# Patient Record
Sex: FEMALE | Race: WHITE | URBAN - NONMETROPOLITAN AREA
[De-identification: names, ages, dates, MRNs, and addresses within clinical notes are randomized per-mention and may not be internally consistent; named-entity substitution may affect disease eponyms.]

---

## 2020-09-30 ENCOUNTER — TELEPHONE (OUTPATIENT)
Dept: OBGYN CLINIC | Age: 20
End: 2020-09-30

## 2020-09-30 NOTE — TELEPHONE ENCOUNTER
Patient calling states that she hasnt started menses yet and is late but negative SPT, wanted to discuss. LMP was 8/15, states she had some brown spotting 9/22-9/24. States sometimes she is sick to her stomach but no vomiting, states negative HPT a few times. Discussed irregular menses, and if she was concerned we could have her come in and discuss if she misses multiple cycles. Advised her for now to monitor, with the spotting if she has a true menses 4 weeks after that time maybe that spotting was just a very light cycle. Offered her a SPT for peace of mind but she wants to wait a little longer and see if cycle starts, may call back if she wants to do an SPT.

## 2021-01-28 ENCOUNTER — HOSPITAL ENCOUNTER (OUTPATIENT)
Age: 21
Setting detail: SPECIMEN
Discharge: HOME | End: 2021-01-28
Payer: COMMERCIAL

## 2021-01-28 ENCOUNTER — OFFICE VISIT (OUTPATIENT)
Dept: OBGYN CLINIC | Age: 21
End: 2021-01-28
Payer: COMMERCIAL

## 2021-01-28 VITALS
HEIGHT: 68 IN | BODY MASS INDEX: 36.83 KG/M2 | WEIGHT: 243 LBS | SYSTOLIC BLOOD PRESSURE: 116 MMHG | DIASTOLIC BLOOD PRESSURE: 78 MMHG

## 2021-01-28 DIAGNOSIS — Z01.419 WOMEN'S ANNUAL ROUTINE GYNECOLOGICAL EXAMINATION: Primary | ICD-10-CM

## 2021-01-28 PROCEDURE — G8484 FLU IMMUNIZE NO ADMIN: HCPCS | Performed by: NURSE PRACTITIONER

## 2021-01-28 PROCEDURE — 99395 PREV VISIT EST AGE 18-39: CPT | Performed by: NURSE PRACTITIONER

## 2021-01-28 NOTE — PROGRESS NOTES
Blood pressure 116/78, height 5' 8\" (1.727 m), weight 243 lb (110.2 kg), last menstrual period 01/08/2021. Estimated body mass index is 36.95 kg/m² as calculated from the following:    Height as of this encounter: 5' 8\" (1.727 m). Weight as of this encounter: 243 lb (110.2 kg). HPI: Patient presents today for annual exam. Denies breast/pelvic pain. Due for first pap. Reports monthly menses aside from last cycle- went 70 days. Admits to significant stress with getting , buying/selling a home in December. TTC x 1 month    Review of Systems   All other systems reviewed and are negative. Objective  Heent:   negative   Cor: regular rate and rhythm, no murmurs              Pul:clear to auscultation bilaterally- no wheezes, rales or rhonchi, normal air movement, no respiratory distress      GI: Abdomen soft, non-tender. BS normal. No masses,  No organomegaly           Extremities: normal strength, tone, and muscle mass, ROM of all joints is normal   Breasts: Breast:normal appearance, no masses or tenderness, No nipple retraction or dimpling, No nipple discharge or bleeding   Pelvic Exam: GENITAL/URINARY:  External Genitalia:  General appearance; normal, Hair distribution; normal, Lesions absent  Urethral Meatus:  Size normal, Location normal, Lesions absent, Prolapse absent  Urethra: Fullness absent, Masses absent  Bladder:  Fullness absent, Masses absent, Tenderness absent, Cystocele absent  Vagina:  General appearance normal, Estrogen effect normal, Discharge absent, Lesions absent, Pelvic support normal  Cervix:  General appearance normal, Lesions absent, Discharge absent, Tenderness absent, Enlargement absent, Nodularity absent  Uterus:  Size normal, Tenderness absent  Adenexa:   Masses absent, Tenderness absent  Anus/Perineum:  Lesions absent and Masses absent                                    Vaginal discharge: no vaginal discharge                             Assessment and Plan Diagnosis Orders   1. Women's annual routine gynecological examination  PAP SMEAR       continue to track menses at this time      I am having Evaristo Schaefer maintain her Prenatal Vit-Fe Fumarate-FA (PRENATAL VITAMIN PO). Return in about 1 year (around 1/28/2022) for yearly. She was also counseled on her preventative health maintenance recommendations and follow-up. There are no Patient Instructions on file for this visit.     [facility] [place],1/28/2021 1:36 PM

## 2021-02-08 LAB — CYTOLOGY REPORT: NORMAL

## 2021-03-17 ENCOUNTER — TELEPHONE (OUTPATIENT)
Dept: OBGYN CLINIC | Age: 21
End: 2021-03-17

## 2021-03-17 NOTE — TELEPHONE ENCOUNTER
Patient left a message on the voicemail that she had some questions about infertility. They have been attempting for about 6 months without success and patient's cycles are very irregular. Her mother told her that she used Clomid and patient is interested in discussing this. I can schedule her a visit, but is there anything that you would like done before this visit? Labs? Ultrasound?

## 2021-03-18 NOTE — TELEPHONE ENCOUNTER
Nope - bring in for an infertility consult with me and we will discuss things further.   W/U will be based on her healthy hx, menses pattern etc.  Needs a 30 minute visit

## 2021-03-18 NOTE — TELEPHONE ENCOUNTER
Spoke to patient and reviewed Giselle's response. She is scheduled for an infertility consult on 4/7. Patient verbalized understanding, no further questions/concerns voiced.

## 2021-04-07 ENCOUNTER — OFFICE VISIT (OUTPATIENT)
Dept: OBGYN CLINIC | Age: 21
End: 2021-04-07
Payer: COMMERCIAL

## 2021-04-07 VITALS
WEIGHT: 250.6 LBS | HEIGHT: 68 IN | DIASTOLIC BLOOD PRESSURE: 80 MMHG | SYSTOLIC BLOOD PRESSURE: 120 MMHG | BODY MASS INDEX: 37.98 KG/M2

## 2021-04-07 DIAGNOSIS — Z13.29 SCREENING FOR ENDOCRINE DISORDER: ICD-10-CM

## 2021-04-07 DIAGNOSIS — Z31.49 PROCREATION MANAGEMENT INVESTIGATION AND TESTING: Primary | ICD-10-CM

## 2021-04-07 DIAGNOSIS — N92.6 IRREGULAR MENSES: ICD-10-CM

## 2021-04-07 PROCEDURE — 99214 OFFICE O/P EST MOD 30 MIN: CPT | Performed by: ADVANCED PRACTICE MIDWIFE

## 2021-04-07 PROCEDURE — 1036F TOBACCO NON-USER: CPT | Performed by: ADVANCED PRACTICE MIDWIFE

## 2021-04-07 PROCEDURE — G8417 CALC BMI ABV UP PARAM F/U: HCPCS | Performed by: ADVANCED PRACTICE MIDWIFE

## 2021-04-07 PROCEDURE — G8427 DOCREV CUR MEDS BY ELIG CLIN: HCPCS | Performed by: ADVANCED PRACTICE MIDWIFE

## 2021-04-07 ASSESSMENT — ENCOUNTER SYMPTOMS
RESPIRATORY NEGATIVE: 1
GASTROINTESTINAL NEGATIVE: 1

## 2021-04-07 NOTE — PROGRESS NOTES
normal.         Behavior: Behavior normal.         Thought Content: Thought content normal.         Judgment: Judgment normal.   Vitals signs and nursing note reviewed. Vital Signs  Blood pressure 120/80, height 5' 8\" (1.727 m), weight 250 lb 9.6 oz (113.7 kg), last menstrual period 2021. HPI Desires to conceive. Reports that stopped preventing pregnancy 2020 and were \"just going with the flow\" but end of Dec 2020 into 2021 started tracking cycles and using OPT at home. Has had some irreg cycles for the last year and has had periods of no ovulation with some cycles. Patient Information  23year old,   BMI 38  LMP 3/21 - currently cycle day 18  2020 to 2021 had a 70 day cycle   to Feb 31 day cycle and did have +OPT at home  Feb to March was 42 day cycle and -OPT at home  Menarch age 13 and cycles were regular \"for years\" every 32 days until approx 1 year ago when she and partner moved in together then they became longer and more irregular  Regarding diet - patient laughed when I inquired - does not eat breakfast \"hardly ever\", lunch often has sandwich or left overs, cooks at home for dinner. Admits to high CHO intake, some vegetables. Soda 1-2/day, 2-3 cisneros/day  Social ETOH, denies drug/tobacco use  Exercise - \"some volleyball\"  Has not monitored BBT or CM  Sister has PCOS  Mother had infertility \"until she started Clomid\"  Has had significant weight gain over the last 7 years    Spouse Information  24year old, 7'9'', 240#  Diet - same as with patient  Exercise - some volleyball  Denies any hx of genital trauma that patient is aware of  No other children  Does not take any medications/vitamins  Social ETOH, denies drug/tobacco use                            Assessment and Plan          Diagnosis Orders   1. Procreation management investigation and testing     2. Irregular menses     3. Body mass index (BMI) 38.0-38.9, adult     4.  Screening for endocrine disorder Leaving for [place] tomorrow and will return next week therefore no day 21 labs this cycle. Discussed current recommendations for treatment and w/u for infertility. Discussed age <30 therefore recommend tracking and actively TTC x12 months prior to considering use of medications such as Clomid, Femara, or RE involvement. She verbalized understanding of this. Discussed that suspect underlying pathology such as PCOS or IR could be cause of her irreg cycles. Discussed w/u for this. Patient will return cycle day 3 for FSH/LH, cycle day 21 for progesterone level. Discussed will also add IR panel, TSH, A1C for her to complete one of the days doing other labs. Patient given notes of time table for labs to be done and will return. Discussed if IR present - primary treatment is diet/exercise and can consider use of metformin to try to regulate levels and subsequently cycles due to hormonal involvement. If anovulatory can consider use of prog supp. If abn thyroid then will need to see PCP. No follicle scan at this time - will await initial labs     Spent >50% of time with patient but >30 minutes regarding evaluation, discussing possible treatment options, and plan of care    I am having Zac Farr maintain her Prenatal Vit-Fe Fumarate-FA (PRENATAL VITAMIN PO). Return if symptoms worsen or fail to improve. She was also counseled on her preventative health maintenance recommendations and follow-up. There are no Patient Instructions on file for this visit.     Nannette MILLER,4/7/2021 8:33 AM                   Electronically signed by DONATO Chaudhry CNM

## 2021-10-12 ENCOUNTER — HOSPITAL ENCOUNTER (OUTPATIENT)
Age: 21
Setting detail: SPECIMEN
Discharge: HOME | End: 2021-10-12
Payer: COMMERCIAL

## 2021-10-12 DIAGNOSIS — Z31.49 PROCREATION MANAGEMENT INVESTIGATION AND TESTING: ICD-10-CM

## 2021-10-12 DIAGNOSIS — Z13.29 SCREENING FOR THYROID DISORDER: ICD-10-CM

## 2021-10-12 DIAGNOSIS — Z31.49 PROCREATION MANAGEMENT INVESTIGATION AND TESTING: Primary | ICD-10-CM

## 2021-10-12 DIAGNOSIS — Z13.29 SCREENING FOR ENDOCRINE DISORDER: ICD-10-CM

## 2021-10-12 LAB
FOLLICLE STIMULATING HORMONE: 6.4 U/L (ref 1.7–21.5)
LH: 6.2 U/L (ref 1–95.6)
TSH SERPL DL<=0.05 MIU/L-ACNC: 2.53 MIU/L (ref 0.3–5)

## 2021-10-12 PROCEDURE — 36415 COLL VENOUS BLD VENIPUNCTURE: CPT | Performed by: ADVANCED PRACTICE MIDWIFE

## 2021-10-13 LAB
ESTIMATED AVERAGE GLUCOSE: 91 MG/DL
HBA1C MFR BLD: 4.8 % (ref 4–6)

## 2021-10-28 ENCOUNTER — HOSPITAL ENCOUNTER (OUTPATIENT)
Age: 21
Setting detail: SPECIMEN
Discharge: HOME | End: 2021-10-28
Payer: COMMERCIAL

## 2021-10-28 DIAGNOSIS — Z13.29 SCREENING FOR THYROID DISORDER: ICD-10-CM

## 2021-10-28 DIAGNOSIS — Z13.29 SCREENING FOR ENDOCRINE DISORDER: ICD-10-CM

## 2021-10-28 DIAGNOSIS — Z31.49 PROCREATION MANAGEMENT INVESTIGATION AND TESTING: ICD-10-CM

## 2021-10-28 LAB
AMOUNT GLUCOSE GIVEN: 75 G
GLUCOSE FASTING: 82 MG/DL (ref 65–99)
GLUCOSE TOLERANCE TEST 1 HOUR: 156 MG/DL (ref 65–184)
GLUCOSE TOLERANCE TEST 2 HOUR: 125 MG/DL (ref 65–139)
INSULIN COMMENT: NORMAL
INSULIN COMMENT: NORMAL
INSULIN REFERENCE RANGE:: NORMAL
INSULIN REFERENCE RANGE:: NORMAL
INSULIN: 188.9 MU/L
INSULIN: 31.1 MU/L
PROGESTERONE LEVEL: 0.22 NG/ML

## 2021-11-04 ENCOUNTER — OFFICE VISIT (OUTPATIENT)
Dept: OBGYN CLINIC | Age: 21
End: 2021-11-04
Payer: COMMERCIAL

## 2021-11-04 VITALS
HEIGHT: 68 IN | WEIGHT: 250 LBS | DIASTOLIC BLOOD PRESSURE: 64 MMHG | BODY MASS INDEX: 37.89 KG/M2 | SYSTOLIC BLOOD PRESSURE: 104 MMHG

## 2021-11-04 DIAGNOSIS — E88.81 INSULIN RESISTANCE: ICD-10-CM

## 2021-11-04 DIAGNOSIS — Z31.49 PROCREATION MANAGEMENT INVESTIGATION AND TESTING: Primary | ICD-10-CM

## 2021-11-04 PROCEDURE — G8427 DOCREV CUR MEDS BY ELIG CLIN: HCPCS | Performed by: ADVANCED PRACTICE MIDWIFE

## 2021-11-04 PROCEDURE — G8417 CALC BMI ABV UP PARAM F/U: HCPCS | Performed by: ADVANCED PRACTICE MIDWIFE

## 2021-11-04 PROCEDURE — 1036F TOBACCO NON-USER: CPT | Performed by: ADVANCED PRACTICE MIDWIFE

## 2021-11-04 PROCEDURE — 99214 OFFICE O/P EST MOD 30 MIN: CPT | Performed by: ADVANCED PRACTICE MIDWIFE

## 2021-11-04 PROCEDURE — G8484 FLU IMMUNIZE NO ADMIN: HCPCS | Performed by: ADVANCED PRACTICE MIDWIFE

## 2021-11-04 ASSESSMENT — ENCOUNTER SYMPTOMS
RESPIRATORY NEGATIVE: 1
GASTROINTESTINAL NEGATIVE: 1

## 2021-11-04 NOTE — PATIENT INSTRUCTIONS
Patient Education        Insulin Resistance: Care Instructions  Your Care Instructions     Insulin resistance means that the body can't use insulin as it should. Insulin lets sugar (glucose) enter the body's cells, where it is used for energy. It also helps muscles, fat, and liver cells store sugar to be released when needed. If the body tissues don't respond to insulin right, the blood sugar level rises. Insulin resistance mainly is caused by obesity. But other medical conditions, such as acromegaly and Cushing's syndrome, also can cause it. It can run in families too. Follow-up care is a key part of your treatment and safety. Be sure to make and go to all appointments, and call your doctor if you are having problems. It's also a good idea to know your test results and keep a list of the medicines you take. How can you care for yourself at home? · Take your medicines exactly as prescribed. Call your doctor if you think you are having a problem with your medicine. You will get more details on the specific medicines your doctor prescribes. · Make healthy food choices. · Get at least 30 minutes of exercise on most days of the week. Exercise helps control your blood sugar. It also helps you stay at a healthy weight. Walking is a good choice. You also may want to do other activities, such as running, swimming, cycling, or playing tennis or team sports. · Try to lose weight. Losing even a small amount of weight can help. · Do not smoke. If you need help quitting, talk to your doctor about stop-smoking programs and medicines. These can increase your chances of quitting for good. When should you call for help? Watch closely for changes in your health, and be sure to contact your doctor if:    · Your blood sugar stays outside the level your doctor set for you.     · You have any problems. Where can you learn more? Go to [email or web address] org and sign in to your [email or web address] account.  Enter [phone] in the Search Health Information box to learn more about \"Insulin Resistance: Care Instructions. \"     If you do not have an account, please click on the \"Sign Up Now\" link. Current as of: August 31, 2020               Content Version: 13.0  © 7607-1004 Healthwise, Incorporated. Care instructions adapted under license by [employer]. If you have questions about a medical condition or this instruction, always ask your healthcare professional. [address] any warranty or liability for your use of this information.

## 2021-11-04 NOTE — PROGRESS NOTES
PROBLEM VISIT     Date of service: 2021    Mary Rasheed  Is a 19 y.o.  female    PT's PCP is: Corrine Maloney MD     : 2000                                          Review of Systems   Constitutional: Negative. HENT: Negative. Respiratory: Negative. Gastrointestinal: Negative. Genitourinary: Negative. Musculoskeletal: Negative. Neurological: Negative. Psychiatric/Behavioral: Negative. Patient's last menstrual period was 10/10/2021 (exact date). OB History    Para Term  AB Living   0 0 0 0 0 0   SAB TAB Ectopic Molar Multiple Live Births   0 0 0 0 0 0        Social History     Tobacco Use   Smoking Status Never Smoker   Smokeless Tobacco Never Used        Social History     Substance and Sexual Activity   Alcohol Use Not Currently       Allergies: Pcn [penicillins]      Current Outpatient Medications:     progesterone (ENDOMETRIN) 100 MG suppository, Place 100 mg vaginally daily, Disp: , Rfl:     metFORMIN (GLUCOPHAGE) 500 MG tablet, Take 1 tablet by mouth daily (with breakfast) 500mg PO x1 week, increase to 1000mg PO x 1 week, then 1500mg PO daily for duration of treatment, Disp: 90 tablet, Rfl: 3    Prenatal Vit-Fe Fumarate-FA (PRENATAL VITAMIN PO), Take by mouth, Disp: , Rfl:     Social History     Substance and Sexual Activity   Sexual Activity Yes    Partners: Male       Chief Complaint   Patient presents with    Follow-up     Pt here to discuss lab results. Physical Exam  Constitutional:       Appearance: Normal appearance. She is obese. HENT:      Head: Normocephalic. Eyes:      Pupils: Pupils are equal, round, and reactive to light. Pulmonary:      Effort: Pulmonary effort is normal.   Musculoskeletal:         General: Normal range of motion. Cervical back: Normal range of motion. Neurological:      Mental Status: She is alert and oriented to person, place, and time. Skin:     General: Skin is warm and dry. Psychiatric:         Behavior: Behavior normal.   Vitals and nursing note reviewed. Vital Signs  Blood pressure 104/64, height 5' 8\" (1.727 m), weight 250 lb (113.4 kg), last menstrual period 10/10/2021. HPI  Here to discuss labs and management of plans for TTC. Recently started metformin - just this week, having diarrhea. Has plans for use of cyclical progestin. Reports has cycles that are 40+ days long. Using home OPT but \"not really sure where I am at\". Has decreased CHO (in the last 2 weeks cut it out), increased greens, cut out soda. Does not eat breakfast regularly, \"little\" lunch, and \"splurge\" for dinner. Exercises - some activity most days, volleyball, soccer. Has gym membership but does not use it. Diarrhea currently with metformin. Results reviewed today:    Reviewed labs again that were done 10/2021  Discussed idea thresholds of insulin for fasting and 2 hour in addition to progesterone levels. Assessment and Plan          Diagnosis Orders   1. Procreation management investigation and testing     2. Insulin resistance         Spent great deal of time discussing insulin resistance and its effects on the body in multiple areas. We also discussed importance of nutrition and more frequent meals to help with metabolism. We also discussed importance of regular exercise - 30 minutes daily of cardio activity. Continue to make nutrition changes. Spent 30 minutes with patient. I am having Cheryl Rivero maintain her Prenatal Vit-Fe Fumarate-FA (PRENATAL VITAMIN PO), metFORMIN, and progesterone. Return in about 3 months (around 2/4/2022) for Yearly. She was also counseled on her preventative health maintenance recommendations and follow-up.      Patient Instructions     Patient Education        Insulin Resistance: Care Instructions  Your Care Instructions     Insulin resistance means that the body can't use insulin as it should. Insulin lets sugar (glucose) enter the body's cells, where it is used for energy. It also helps muscles, fat, and liver cells store sugar to be released when needed. If the body tissues don't respond to insulin right, the blood sugar level rises. Insulin resistance mainly is caused by obesity. But other medical conditions, such as acromegaly and Cushing's syndrome, also can cause it. It can run in families too. Follow-up care is a key part of your treatment and safety. Be sure to make and go to all appointments, and call your doctor if you are having problems. It's also a good idea to know your test results and keep a list of the medicines you take. How can you care for yourself at home? · Take your medicines exactly as prescribed. Call your doctor if you think you are having a problem with your medicine. You will get more details on the specific medicines your doctor prescribes. · Make healthy food choices. · Get at least 30 minutes of exercise on most days of the week. Exercise helps control your blood sugar. It also helps you stay at a healthy weight. Walking is a good choice. You also may want to do other activities, such as running, swimming, cycling, or playing tennis or team sports. · Try to lose weight. Losing even a small amount of weight can help. · Do not smoke. If you need help quitting, talk to your doctor about stop-smoking programs and medicines. These can increase your chances of quitting for good. When should you call for help? Watch closely for changes in your health, and be sure to contact your doctor if:    · Your blood sugar stays outside the level your doctor set for you.     · You have any problems. Where can you learn more? Go to [email or web address] org and sign in to your [employer] account. Enter [phone] in the [employer] box to learn more about \"Insulin Resistance: Care Instructions. \"     If you do not have an account, please click on the \"Sign Up Now\" link. Current as of: August 31, 2020               Content Version: 13.0  © 7252-2204 Healthwise, Incorporated. Care instructions adapted under license by [facility] ([facility]). If you have questions about a medical condition or this instruction, always ask your healthcare professional. [address] any warranty or liability for your use of this information.              DONATO Arias CNM,11/4/2021 12:32 PM                 Electronically signed by DONATO Arias CNM

## 2021-12-01 ENCOUNTER — TELEPHONE (OUTPATIENT)
Dept: OBGYN CLINIC | Age: 21
End: 2021-12-01

## 2021-12-01 NOTE — TELEPHONE ENCOUNTER
Patient called stating that she has been on the metformin for several months and cannot tolerate the side effects. She is running to the bathroom after she eats and noticing severe mood changes. She starting taking one metformin daily for a week, then increased to 2 pills daily and the side effects were intolerable, so she went back down to 1 pill daily. She continued on the 1 pill daily for another 2 weeks and attempted to get back up to 2 pills and was not able to function. She is questioning if there is anything else she can try? Can you please review and give recommendations?

## 2021-12-02 RX ORDER — METFORMIN HYDROCHLORIDE 500 MG/1
500 TABLET, EXTENDED RELEASE ORAL DAILY
Qty: 90 TABLET | Refills: 3 | Status: SHIPPED | OUTPATIENT
Start: 2021-12-02 | End: 2022-08-08

## 2022-01-05 ENCOUNTER — TELEPHONE (OUTPATIENT)
Dept: OBGYN CLINIC | Age: 22
End: 2022-01-05

## 2022-02-07 ENCOUNTER — OFFICE VISIT (OUTPATIENT)
Dept: OBGYN CLINIC | Age: 22
End: 2022-02-07
Payer: COMMERCIAL

## 2022-02-07 VITALS
WEIGHT: 247.6 LBS | HEIGHT: 68 IN | SYSTOLIC BLOOD PRESSURE: 104 MMHG | DIASTOLIC BLOOD PRESSURE: 74 MMHG | BODY MASS INDEX: 37.53 KG/M2

## 2022-02-07 DIAGNOSIS — E88.81 INSULIN RESISTANCE: ICD-10-CM

## 2022-02-07 DIAGNOSIS — Z12.72 SMEAR, VAGINAL, AS PART OF ROUTINE GYNECOLOGICAL EXAMINATION: Primary | ICD-10-CM

## 2022-02-07 DIAGNOSIS — Z01.419 SMEAR, VAGINAL, AS PART OF ROUTINE GYNECOLOGICAL EXAMINATION: Primary | ICD-10-CM

## 2022-02-07 DIAGNOSIS — Z31.49 ENCOUNTER FOR INVESTIGATION AND TESTING FOR PROCREATIVE MANAGEMENT: ICD-10-CM

## 2022-02-07 PROCEDURE — G8484 FLU IMMUNIZE NO ADMIN: HCPCS | Performed by: ADVANCED PRACTICE MIDWIFE

## 2022-02-07 PROCEDURE — G8417 CALC BMI ABV UP PARAM F/U: HCPCS | Performed by: ADVANCED PRACTICE MIDWIFE

## 2022-02-07 PROCEDURE — G8427 DOCREV CUR MEDS BY ELIG CLIN: HCPCS | Performed by: ADVANCED PRACTICE MIDWIFE

## 2022-02-07 PROCEDURE — 1036F TOBACCO NON-USER: CPT | Performed by: ADVANCED PRACTICE MIDWIFE

## 2022-02-07 PROCEDURE — 99213 OFFICE O/P EST LOW 20 MIN: CPT | Performed by: ADVANCED PRACTICE MIDWIFE

## 2022-02-07 PROCEDURE — 99395 PREV VISIT EST AGE 18-39: CPT | Performed by: ADVANCED PRACTICE MIDWIFE

## 2022-02-07 ASSESSMENT — ENCOUNTER SYMPTOMS
SHORTNESS OF BREATH: 0
ABDOMINAL PAIN: 0
DIARRHEA: 0
GASTROINTESTINAL NEGATIVE: 1
CONSTIPATION: 0
RESPIRATORY NEGATIVE: 1

## 2022-02-07 NOTE — PROGRESS NOTES
Lung Cancer Paternal Grandfather     Heart Failure Maternal Grandfather     Polycystic Ovary Syndrome Sister        Chief Complaint   Patient presents with    Annual Exam     Annual exam. Pap NL 1/28/21.  Menstrual Problem     About 2 weeks ago had 2-3 days spotting then nothing. Pt did not count this as cycle and is now on day 38. Labs:    No results found for this visit on 02/07/22. HPI: Annual.  Denies breast/pelvic concerns. Menses have been every 37-39 days. However most recent cycle - had positive home OPT on cycle day 19, spotted cycle day 23-25, and today cycle day 37. Has been having timed intercourse. Has been using progesterone 100mg qhs cycle day 16 through menses. She is aware if positive pregnancy test we will continue through first trimester. Continues metformin xr- loose stools improved some but still present - 2/day \"before it was all day every day so better\". Is continuing diet changes - more vegetables, fruit. Is now eating breakfast.  Struggling to eat something every few hours. Has increased water, decreased soda and ETOh use only 2/month. Going to the gym 2/wk. Has been TTC since 9/2020    Review of Systems   Constitutional: Negative. Negative for chills, fatigue and fever. HENT: Negative. Respiratory: Negative. Negative for shortness of breath. Cardiovascular: Negative. Negative for chest pain. Gastrointestinal: Negative. Negative for abdominal pain, constipation and diarrhea. Genitourinary: Negative for dysuria, enuresis, frequency, menstrual problem (continues TTC), pelvic pain, urgency and vaginal bleeding. Musculoskeletal: Negative. Neurological: Negative. Negative for dizziness, light-headedness and headaches. Psychiatric/Behavioral: Negative. Objective  Blood pressure 104/74, height 5' 8\" (1.727 m), weight 247 lb 9.6 oz (112.3 kg), last menstrual period 01/02/2022.   Physical Exam  Constitutional:       Appearance: Normal reviewed. Colonoscopy screening reviewed as well as onset for bone density testing. Birth control and barrier recommendationsdiscussed. STD counseling and prevention reviewed. Routine healthmaintenance per patients PCP. In addition to routine annual exam spent 20 minutes discussing procreation and plans to conceive. Encouraged to continue metformin, progesterone, diet/exercise. Encouraged to increase frequency that goes to the gym for more activity. We also discussed timed intercourse and follicle scan. Patient did inquire about clomid - discussed will not rx unless we have an initial follicle scan to determine candidacy and appropriate further treatment. Discussed call cycle day 1 for a day 12 scan with f/u. Also discussed semen analysis possibly - she would like to proceed with this. Script and cup with directions given to patient and she will have spouse complete. Encouraged her to call our office once her completes to make sure we can watch for and get results. I am having Anaid Hilario maintain her Prenatal Vit-Fe Fumarate-FA (PRENATAL VITAMIN PO), progesterone, and metFORMIN. Return in about 1 year (around 2/7/2023), or Call cycle day 1 of normal bleeding for a day 12 follicle scan and f/u, for Yearly. She was also counseled on her preventative health maintenance recommendations and follow-up. There are no Patient Instructions on file for this visit.     DONATO Alvares CNM,2/7/2022 9:20 AM

## 2022-02-14 ENCOUNTER — TELEPHONE (OUTPATIENT)
Dept: OBGYN CLINIC | Age: 22
End: 2022-02-14

## 2022-02-14 NOTE — TELEPHONE ENCOUNTER
Please call patient with semen analysis - received results - they are in scan pile    Overall normal but motility slightly low - not necessarily cause of issues conceiving at this time. I would suggest they continue with plan - call cycle day 1 for follicle scan day 12.   If they would like - he could see urology or both go to RE together but likely not going to treat him currently

## 2022-03-09 ENCOUNTER — TELEPHONE (OUTPATIENT)
Dept: OBGYN CLINIC | Age: 22
End: 2022-03-09

## 2022-03-09 NOTE — TELEPHONE ENCOUNTER
Pt called and stated she was suppose to call cycle day 1 for Day 12 USN. However her LMP was on 1/2/22. Pt is wondering what her next steps would be.

## 2022-03-28 ENCOUNTER — TELEPHONE (OUTPATIENT)
Dept: OBGYN CLINIC | Age: 22
End: 2022-03-28

## 2022-03-28 DIAGNOSIS — N91.2 AMENORRHEA: Primary | ICD-10-CM

## 2022-03-28 NOTE — TELEPHONE ENCOUNTER
Okay to order HCG level - if negative then wait and if no menses x 3 months then will give provera challenge if neg HCG

## 2022-03-28 NOTE — TELEPHONE ENCOUNTER
Pt called and still no cycle. Could you put in an order for HCG please. She will come in sometime this week.  3-28-22 cg  THANKS

## 2022-03-30 ENCOUNTER — HOSPITAL ENCOUNTER (OUTPATIENT)
Age: 22
Setting detail: SPECIMEN
Discharge: HOME | End: 2022-03-30

## 2022-03-30 DIAGNOSIS — N91.2 AMENORRHEA: ICD-10-CM

## 2022-03-31 LAB — HCG QUANTITATIVE: <1 MIU/ML

## 2022-03-31 RX ORDER — MEDROXYPROGESTERONE ACETATE 10 MG/1
10 TABLET ORAL DAILY
Qty: 10 TABLET | Refills: 0 | Status: SHIPPED | OUTPATIENT
Start: 2022-03-31 | End: 2022-04-25 | Stop reason: ALTCHOICE

## 2022-04-06 NOTE — TELEPHONE ENCOUNTER
Patient called requesting a refill of her progesterone suppository. [facility] has used her last [facility] Ana Whitney to send in a Rx for #30 suppository with 3 refills. Patient aware that refills were going to be called in and I would only call her back if there were any issues.

## 2022-04-25 ENCOUNTER — OFFICE VISIT (OUTPATIENT)
Dept: OBGYN CLINIC | Age: 22
End: 2022-04-25
Payer: COMMERCIAL

## 2022-04-25 VITALS
BODY MASS INDEX: 37.92 KG/M2 | WEIGHT: 250.2 LBS | HEIGHT: 68 IN | SYSTOLIC BLOOD PRESSURE: 104 MMHG | DIASTOLIC BLOOD PRESSURE: 70 MMHG

## 2022-04-25 DIAGNOSIS — Z31.49 PROCREATION MANAGEMENT INVESTIGATION AND TESTING: Primary | ICD-10-CM

## 2022-04-25 PROCEDURE — 99214 OFFICE O/P EST MOD 30 MIN: CPT | Performed by: ADVANCED PRACTICE MIDWIFE

## 2022-04-25 ASSESSMENT — ENCOUNTER SYMPTOMS: RESPIRATORY NEGATIVE: 1

## 2022-04-25 NOTE — PROGRESS NOTES
PROBLEM VISIT     Date of service: 2022    Penny Lorenzana  Is a 19 y.o.  female    PT's PCP is: Aida Jensen MD     : 2000                                          Review of Systems   Constitutional: Negative. HENT: Negative. Respiratory: Negative. Gastrointestinal:        GI distress with metformin     Genitourinary: Positive for menstrual problem (Irregular cycles). Patient's last menstrual period was 2022. OB History    Para Term  AB Living   0 0 0 0 0 0   SAB IAB Ectopic Molar Multiple Live Births   0 0 0 0 0 0        Social History     Tobacco Use   Smoking Status Never Smoker   Smokeless Tobacco Never Used        Social History     Substance and Sexual Activity   Alcohol Use Yes    Comment: rare       Allergies: Pcn [penicillins]      Current Outpatient Medications:     progesterone (ENDOMETRIN) 100 MG suppository, Place 1 suppository vaginally daily, Disp: 30 suppository, Rfl: 3    Prenatal Vit-Fe Fumarate-FA (PRENATAL VITAMIN PO), Take by mouth, Disp: , Rfl:     metFORMIN (GLUCOPHAGE-XR) 500 MG extended release tablet, Take 1 tablet by mouth daily Take 1 tablet by mouth daily (with breakfast) 500mg PO x1 week, increase to 1000mg PO x 1 week, then 1500mg PO daily for duration of treatment (Patient not taking: Reported on 2022), Disp: 90 tablet, Rfl: 3    Social History     Substance and Sexual Activity   Sexual Activity Yes    Partners: Male       Chief Complaint   Patient presents with    Follow-up     Pt here for Day 12 follicle scan f/u. Pt denies concerns. Physical Exam  Constitutional:       Appearance: Normal appearance. She is obese. HENT:      Head: Normocephalic. Eyes:      Pupils: Pupils are equal, round, and reactive to light. Pulmonary:      Effort: Pulmonary effort is normal.   Musculoskeletal:         General: Normal range of motion. Cervical back: Normal range of motion.    Neurological:      Mental Status: She is alert and oriented to person, place, and time. Skin:     General: Skin is warm and dry. Psychiatric:         Behavior: Behavior normal.   Vitals and nursing note reviewed. Vital Signs  Blood pressure 104/70, height 5' 8\" (1.727 m), weight 250 lb 3.2 oz (113.5 kg), last menstrual period 04/14/2022. HPI  USn f/u. Follicle scan today. Reports is exercising every other day. Stopped metformin due to Gi issues even on one pill. Despite having diet changes the GI stress continued. Continues progesterone. Most recent cycle was approx 100days. Was induced with Provera - cycle was lighter than expected she reports. Currently cycle day 12. Results reviewed today:    USN today - follicle scan  Cycle day 12  Uterus 9.1 x 4.7 x 4.0cm  Endometrium 4.2mm  Multiple small follicles on the right ovary - PCOS appearance  Largest follicles on the right <2CG  Largest follicle on the left < 1cm                              Assessment and Plan          Diagnosis Orders   1. Procreation management investigation and testing         Discussed limitations to get regular cycles secondary to no OCP as TTC and did not tolerate metformin XR. Discussed consider endocrine referral.    Discussed options for additional mgmt - clomid or femara cyckle day 5-9. In addition - if use clomid will also do estradiol due to lining <5mm cycle day 8-12. Timed intercourse and a follicle scan with cycle day 10-12. After thorough discussion - elects to continue in our office only at this time - will call cycle day 1 but if another long cycle can induce one additional time for med dosing. Otherwise referral to endocrine and possibly RE. Patient is agreeable    Spent 30 minutes on this patient - review, discussion of additional treatment options, assessment      I have discontinued Armin Cavanaugh's medroxyPROGESTERone.  I am also having her maintain her Prenatal Vit-Fe Fumarate-FA (PRENATAL VITAMIN PO), metFORMIN, and progesterone. No follow-ups on file. She was also counseled on her preventative health maintenance recommendations and follow-up. There are no Patient Instructions on file for this visit.     Deshaun 9:58 AM                   Electronically signed by DONATO Wood CNM

## 2022-04-28 ENCOUNTER — TELEPHONE (OUTPATIENT)
Dept: OBGYN CLINIC | Age: 22
End: 2022-04-28

## 2022-04-28 NOTE — TELEPHONE ENCOUNTER
Patient called stating that she was in on 4/25 for her day 12 ultrasound and was given a game plan for her next cycle. She went home and talked to her  and told him that she may need a referral to RE if cycles cannot get regulated. They discussed that they would only eat at home and she would start the metformin again. She began taking the metformin again on 4/25 and states that she is noticing constipation since she started taking it. She also started her cycle today and states that it would be comparable to a normal cycle. She is noticing minimal cramping with the bleeding. She was told to call on cycle day 1 to get started on medication. Can you please review and give recommendations?

## 2022-04-28 NOTE — TELEPHONE ENCOUNTER
I am quite hesitant to rx any clomid/femara this cycle. Simply due to the inconsistent cycle recently - she had a provera challenege and bled at appropriate time afterwards so this is a very irregular pattern. Please continue metformin restart and call with next cycle.   Continue tracking menses

## 2022-04-28 NOTE — TELEPHONE ENCOUNTER
Spoke to patient and reviewed Giselle's recommendations. Patient verbalized understanding, no further questions/concerns voiced.

## 2022-05-25 ENCOUNTER — TELEPHONE (OUTPATIENT)
Dept: OBGYN CLINIC | Age: 22
End: 2022-05-25

## 2022-05-25 RX ORDER — ESTRADIOL 2 MG/1
2 TABLET ORAL DAILY
Qty: 5 TABLET | Refills: 0 | Status: SHIPPED | OUTPATIENT
Start: 2022-05-25 | End: 2022-07-11 | Stop reason: ALTCHOICE

## 2022-05-25 NOTE — TELEPHONE ENCOUNTER
Pt called stated she started her cycle and would like clomid and estrogen pills. Per KYLE Simon okay to send in Clomid 50mg cycle day 5-9 and estrace 2 mg cycles day 8-12. Pt will need follicle scan day 79-81. Left detailed VM for pt to call back and schedule. Scripts sent.

## 2022-06-06 ENCOUNTER — TELEPHONE (OUTPATIENT)
Dept: OBGYN CLINIC | Age: 22
End: 2022-06-06

## 2022-06-06 ENCOUNTER — OFFICE VISIT (OUTPATIENT)
Dept: OBGYN CLINIC | Age: 22
End: 2022-06-06
Payer: COMMERCIAL

## 2022-06-06 VITALS — BODY MASS INDEX: 36.95 KG/M2 | SYSTOLIC BLOOD PRESSURE: 122 MMHG | WEIGHT: 243 LBS | DIASTOLIC BLOOD PRESSURE: 77 MMHG

## 2022-06-06 DIAGNOSIS — Z31.49 PROCREATION MANAGEMENT INVESTIGATION AND TESTING: Primary | ICD-10-CM

## 2022-06-06 PROCEDURE — 99213 OFFICE O/P EST LOW 20 MIN: CPT | Performed by: NURSE PRACTITIONER

## 2022-06-06 NOTE — TELEPHONE ENCOUNTER
Patient was here for follicle scan follow up while you were out. Below is the report. Discussed you would call her with any adjustments to medication or POC.  She is aware to call day 1 of next cycle      Cycle day 13     Uterus measures: 9.0  x 4.8  x 3.8 cm  Endometrium measures: 5.2 mm  Largest follicle on the right ovary measures: 0.7 x 0.7 x 0.7 cm  Largest follicle on the left ovary measures: 0.8 x 0.5 x 0.7 cm

## 2022-06-06 NOTE — PROGRESS NOTES
PROBLEM VISIT     Date of service: 2022    Dean Dias  Is a 19 y.o.  female    PT's PCP is: Yan Schneider MD     : 2000                                             Subjective:       Patient's last menstrual period was 2022. OB History    Para Term  AB Living   0 0 0 0 0 0   SAB IAB Ectopic Molar Multiple Live Births   0 0 0 0 0 0        Social History     Tobacco Use   Smoking Status Never Smoker   Smokeless Tobacco Never Used        Social History     Substance and Sexual Activity   Alcohol Use Yes    Comment: rare       Allergies: Pcn [penicillins]      Current Outpatient Medications:     metFORMIN (GLUCOPHAGE-XR) 500 MG extended release tablet, Take 1 tablet by mouth daily Take 1 tablet by mouth daily (with breakfast) 500mg PO x1 week, increase to 1000mg PO x 1 week, then 1500mg PO daily for duration of treatment, Disp: 90 tablet, Rfl: 3    clomiPHENE (CLOMID) 50 MG tablet, Take 1 tablet by mouth daily Cycle day 5-9, Disp: 5 tablet, Rfl: 0    estradiol (ESTRACE) 2 MG tablet, Take 1 tablet by mouth daily Cycle day 8-12., Disp: 5 tablet, Rfl: 0    progesterone (ENDOMETRIN) 100 MG suppository, Place 1 suppository vaginally daily, Disp: 30 suppository, Rfl: 3    Prenatal Vit-Fe Fumarate-FA (PRENATAL VITAMIN PO), Take by mouth, Disp: , Rfl:     Social History     Substance and Sexual Activity   Sexual Activity Yes    Partners: Male     Chief Complaint   Patient presents with    Follow-up     Follow up day 13 follicle scan        PE:  Vital Signs  Blood pressure 122/77, weight 243 lb (110.2 kg), last menstrual period 2022. HPI: Patient presents today following day 13 follicle scan. Completed first round of Clomid. PT denies fever, chills, nausea and vomiting       Review of Systems   Constitutional: Negative. Genitourinary: Negative. Physical Exam  Constitutional:       Appearance: Normal appearance. HENT:      Head: Normocephalic. Pulmonary:      Effort: Pulmonary effort is normal.   Musculoskeletal:         General: Normal range of motion. Neurological:      General: No focal deficit present. Mental Status: She is alert. Psychiatric:         Mood and Affect: Mood normal.         Behavior: Behavior normal.       Cycle day 13     Uterus measures: 9.0  x 4.8  x 3.8 cm  Endometrium measures: 5.2 mm  Largest follicle on the right ovary measures: 0.7 x 0.7 x 0.7 cm  Largest follicle on the left ovary measures: 0.8 x 0.5 x 0.7 cm    Assessment and Plan          Diagnosis Orders   1. Procreation management investigation and testing         ultrasound reviewed with patient. Small follicles noted on bilateral ovaries  Discussed possible next steps and expectations   Aware to call day 1 of next cycle       I am having Gisela George maintain her Prenatal Vit-Fe Fumarate-FA (PRENATAL VITAMIN PO), metFORMIN, progesterone, clomiPHENE, and estradiol. Return if symptoms worsen or fail to improve. There are no Patient Instructions on file for this visit.     Time spent 20 minutes      DONATO Rosenberg NP,6/6/2022 9:34 AM

## 2022-06-07 NOTE — TELEPHONE ENCOUNTER
Okay thank you    Amadou Weinberg - will you just call and let her know that next cycle we will increase the clomid, continue with estrace also.     Thanks

## 2022-07-01 NOTE — PROGRESS NOTES
Chaperone for Intimate Exam   Chaperone was offered as part of the rooming process. Patient declined and agrees to continue with exam without a chaperone. Yes

## 2022-07-05 ENCOUNTER — TELEPHONE (OUTPATIENT)
Dept: OBGYN CLINIC | Age: 22
End: 2022-07-05

## 2022-07-05 NOTE — TELEPHONE ENCOUNTER
Okay to send Clomid 75mg PO cycle day 4-8, dispense #5 no refills  Lets hold off on Estradiol with this since lining was >5mm  Cycle day 59-07 follicle scan with a f/u

## 2022-07-05 NOTE — TELEPHONE ENCOUNTER
Patient called back and I let her know that per BR no estradiol this round, clomid upped to 75 mg and scheduled USN. Worked into BR schedule with her assistance since both days were originally full. Rx sent to [facility].

## 2022-07-05 NOTE — TELEPHONE ENCOUNTER
Patient calling for refill of Clomid. Do we sent estrogen also for her? Or no since her last USN showed 5.3 mm lining?

## 2022-07-11 ENCOUNTER — OFFICE VISIT (OUTPATIENT)
Dept: OBGYN CLINIC | Age: 22
End: 2022-07-11
Payer: COMMERCIAL

## 2022-07-11 VITALS
WEIGHT: 238 LBS | SYSTOLIC BLOOD PRESSURE: 110 MMHG | BODY MASS INDEX: 36.07 KG/M2 | DIASTOLIC BLOOD PRESSURE: 80 MMHG | HEIGHT: 68 IN

## 2022-07-11 DIAGNOSIS — Z31.49 PROCREATION MANAGEMENT INVESTIGATION AND TESTING: Primary | ICD-10-CM

## 2022-07-11 PROCEDURE — 99213 OFFICE O/P EST LOW 20 MIN: CPT | Performed by: ADVANCED PRACTICE MIDWIFE

## 2022-07-11 ASSESSMENT — ENCOUNTER SYMPTOMS
GASTROINTESTINAL NEGATIVE: 1
RESPIRATORY NEGATIVE: 1

## 2022-07-11 NOTE — PROGRESS NOTES
PROBLEM VISIT     Date of service: 2022    Thad Collazo  Is a 19 y.o.  female    PT's PCP is: Matilda Thomas MD     : 2000                                          Review of Systems   Constitutional: Negative. HENT: Negative. Respiratory: Negative. Gastrointestinal: Negative. Genitourinary: Negative. Neurological: Negative. Psychiatric/Behavioral: Negative. Patient's last menstrual period was 2022 (exact date). OB History    Para Term  AB Living   0 0 0 0 0 0   SAB IAB Ectopic Molar Multiple Live Births   0 0 0 0 0 0        Social History     Tobacco Use   Smoking Status Never Smoker   Smokeless Tobacco Never Used        Social History     Substance and Sexual Activity   Alcohol Use Yes    Comment: rare       Allergies: Pcn [penicillins]      Current Outpatient Medications:     progesterone (ENDOMETRIN) 100 MG suppository, Cycle day 16 through onset of menses or if becomes pregnant then through first trimester of pregnancy, Disp: 30 suppository, Rfl: 3    metFORMIN (GLUCOPHAGE-XR) 500 MG extended release tablet, Take 1 tablet by mouth daily Take 1 tablet by mouth daily (with breakfast) 500mg PO x1 week, increase to 1000mg PO x 1 week, then 1500mg PO daily for duration of treatment, Disp: 90 tablet, Rfl: 3    Prenatal Vit-Fe Fumarate-FA (PRENATAL VITAMIN PO), Take by mouth, Disp: , Rfl:     Social History     Substance and Sexual Activity   Sexual Activity Yes    Partners: Male       Chief Complaint   Patient presents with    Follow-up     Pt here for day 10-12 USN f/u. Pt denies concerns. Pt would like refill of progesterone supp. Physical Exam  Constitutional:       Appearance: Normal appearance. She is obese. HENT:      Head: Normocephalic. Eyes:      Pupils: Pupils are equal, round, and reactive to light. Pulmonary:      Effort: Pulmonary effort is normal.   Musculoskeletal:         General: Normal range of motion. Cervical back: Normal range of motion. Neurological:      Mental Status: She is alert and oriented to person, place, and time. Skin:     General: Skin is warm and dry. Psychiatric:         Behavior: Behavior normal.   Vitals and nursing note reviewed. Vital Signs  Blood pressure 110/80, height 5' 8\" (1.727 m), weight 238 lb (108 kg), last menstrual period 07/01/2022. HPI Here for follicle scan. Currently completed round 2 - this cycle did Clomid 75 mg x 5 days, plans to continue with progesterone and metformin. Results reviewed today:    6/6 USN no dominant follicle, lining was 0.4R    Today USN  Cycle day 11  Uterus is 8.2 x 4.6 x 3.7cm  Endometrium 8.4VR  Largest follicle on the right 1.0 x 1.1 x 1.7AC  Largest follicle on the left 1.4 x 1.1 x 1.4cm                              Assessment and Plan          Diagnosis Orders   1. Procreation management investigation and testing       Follicle now present but still <2cm, discussed timed intercourse  If no pregnancy - next cycle call and will send Clomid 100mg x 5 day dosing  Discussed possibly consider dx lap if no pregnancy in the next 2 cycles - she will consider. If abnormal menses needs negative SPT prior to sending script        I have discontinued Saurabh Cavanaugh's clomiPHENE, estradiol, and clomiPHENE. I am also having her start on progesterone. Additionally, I am having her maintain her Prenatal Vit-Fe Fumarate-FA (PRENATAL VITAMIN PO) and metFORMIN. No follow-ups on file. She was also counseled on her preventative health maintenance recommendations and follow-up. There are no Patient Instructions on file for this visit.     DONATO Marin CNM,7/11/2022 11:01 AM                   Electronically signed by DONATO Marin CNM

## 2022-08-08 ENCOUNTER — TELEPHONE (OUTPATIENT)
Dept: OBGYN CLINIC | Age: 22
End: 2022-08-08

## 2022-08-08 RX ORDER — METFORMIN HYDROCHLORIDE 500 MG/1
TABLET, EXTENDED RELEASE ORAL
Qty: 90 TABLET | Refills: 3 | Status: SHIPPED | OUTPATIENT
Start: 2022-08-08

## 2022-08-08 NOTE — TELEPHONE ENCOUNTER
Patient left a message on the voicemail that she needed a refill of Clomid. Per Giselle's note on 7/11, if no pregnancy that cycle, will increase Clomid to 100 mg x 5 days.

## 2022-08-16 ENCOUNTER — TELEPHONE (OUTPATIENT)
Dept: OBGYN CLINIC | Age: 22
End: 2022-08-16

## 2022-08-16 ENCOUNTER — OFFICE VISIT (OUTPATIENT)
Dept: OBGYN CLINIC | Age: 22
End: 2022-08-16
Payer: COMMERCIAL

## 2022-08-16 VITALS
WEIGHT: 236 LBS | HEIGHT: 68 IN | BODY MASS INDEX: 35.77 KG/M2 | DIASTOLIC BLOOD PRESSURE: 74 MMHG | SYSTOLIC BLOOD PRESSURE: 110 MMHG

## 2022-08-16 DIAGNOSIS — Z31.49 PROCREATION MANAGEMENT INVESTIGATION AND TESTING: ICD-10-CM

## 2022-08-16 DIAGNOSIS — Z01.818 PRE-OP TESTING: Primary | ICD-10-CM

## 2022-08-16 DIAGNOSIS — Z31.49 PROCREATION MANAGEMENT INVESTIGATION AND TESTING: Primary | ICD-10-CM

## 2022-08-16 DIAGNOSIS — N92.6 IRREGULAR MENSES: ICD-10-CM

## 2022-08-16 PROCEDURE — 99213 OFFICE O/P EST LOW 20 MIN: CPT | Performed by: ADVANCED PRACTICE MIDWIFE

## 2022-08-16 ASSESSMENT — ENCOUNTER SYMPTOMS
RESPIRATORY NEGATIVE: 1
GASTROINTESTINAL NEGATIVE: 1

## 2022-08-16 NOTE — TELEPHONE ENCOUNTER
Desires dx lap and chromopertubation - TTC and just completed round three of treatment.   Aware you are out this week and will call when return to office

## 2022-08-16 NOTE — PROGRESS NOTES
PROBLEM VISIT     Date of service: 2022    Marah Castillo  Is a 19 y.o.  female    PT's PCP is: Jose Rodriguez MD     : 2000                                          HPI Here for f/u, follicle scan. Third round of Clomid. Reports last month did have +OPT /,18,19. Deneis any other changes/concerns    Review of Systems   Constitutional: Negative. HENT: Negative. Respiratory: Negative. Gastrointestinal: Negative. Genitourinary: Negative. Neurological: Negative. Psychiatric/Behavioral: Negative. Patient's last menstrual period was 2022 (exact date). OB History    Para Term  AB Living   0 0 0 0 0 0   SAB IAB Ectopic Molar Multiple Live Births   0 0 0 0 0 0        Social History     Tobacco Use   Smoking Status Never   Smokeless Tobacco Never        Social History     Substance and Sexual Activity   Alcohol Use Yes    Comment: rare       Allergies: Pcn [penicillins]      Current Outpatient Medications:     metFORMIN (GLUCOPHAGE-XR) 500 MG extended release tablet, take 1 tablet by mouth once daily WITH BREAKFAST FOR 1 WEEK ,INCREASE TO 2 TABLETS DAILY FOR 1 WEEK THEN 3 TABLETS DAILY FOR DURATION OF TREATMENT, Disp: 90 tablet, Rfl: 3    progesterone (ENDOMETRIN) 100 MG suppository, Cycle day 16 through onset of menses or if becomes pregnant then through first trimester of pregnancy, Disp: 30 suppository, Rfl: 3    Prenatal Vit-Fe Fumarate-FA (PRENATAL VITAMIN PO), Take by mouth, Disp: , Rfl:     clomiPHENE (CLOMID) 50 MG tablet, Take 2 tablets by mouth in the morning for 5 days. Cycle day 5-9., Disp: 10 tablet, Rfl: 0    Social History     Substance and Sexual Activity   Sexual Activity Yes    Partners: Male       Chief Complaint   Patient presents with    Other     Pt here for USN f/u for follicle scan. Pt denies concerns. Physical Exam  Constitutional:       Appearance: Normal appearance. She is obese.    HENT:      Head: Normocephalic. Eyes:      Pupils: Pupils are equal, round, and reactive to light. Pulmonary:      Effort: Pulmonary effort is normal.   Musculoskeletal:         General: Normal range of motion. Cervical back: Normal range of motion. Neurological:      Mental Status: She is alert and oriented to person, place, and time. Skin:     General: Skin is warm and dry. Psychiatric:         Behavior: Behavior normal.   Vitals and nursing note reviewed. Vital Signs  Blood pressure 110/74, height 5' 8\" (1.727 m), weight 236 lb (107 kg), last menstrual period 08/05/2022. Results reviewed today:    USN today  Cycle day 11  Uterus 8.7 x 5.0 x 3.6cm  Endometrium 7.6mm  Right ovary with follicle 0.1CD at greatest dimension  Left ovary with follicle 1.9FQ at greatest dimension                              Assessment and Plan          Diagnosis Orders   1. Procreation management investigation and testing            Discussed still slightly under 2cm - discussed continue timed intercourse  Discussed options to proceed with dx lap and chromopertubation for tube patency. They are agreeable and aware I will send note to PRIYANK Torres  Discussed next cycle can consider another round of clomid or change to Femara 5mg starting dose - they will consider        I am having Linda Tapia maintain her Prenatal Vit-Fe Fumarate-FA (PRENATAL VITAMIN PO), progesterone, metFORMIN, and clomiPHENE. No follow-ups on file. She was also counseled on her preventative health maintenance recommendations and follow-up. There are no Patient Instructions on file for this visit.     [address] 12:27 PM                     Electronically signed by DONATO Black CNM

## 2022-08-29 NOTE — TELEPHONE ENCOUNTER
I spoke to patient and reviewed surgery expectations and recovery. She is scheduled for a Diagnostic Laparoscopy, lysis of adhesions, ablation of endometriosis, chromopertubation at [facility] on 11/7/2022. She is aware that a nurse from [facility] will call her to complete a phone interview and arrange COVID testing if needed. She works at [employer] and denies needing a note for work. Patient will come in to see Dr. Michelle Cardona prior to surgery and will get labs at that visit. We will also follow up 2-4 weeks after surgery. Appointments scheduled. Patient verbalized understanding, no further questions/concerns voiced.

## 2022-09-08 ENCOUNTER — TELEPHONE (OUTPATIENT)
Dept: OBGYN CLINIC | Age: 22
End: 2022-09-08

## 2022-09-08 RX ORDER — LETROZOLE 2.5 MG/1
5 TABLET, FILM COATED ORAL DAILY
Qty: 5 TABLET | Refills: 0 | Status: SHIPPED | OUTPATIENT
Start: 2022-09-08 | End: 2022-09-19

## 2022-09-08 NOTE — TELEPHONE ENCOUNTER
Patient called stating that she picked up her Rx and was only given 5 pills. Her Rx says to take 2 daily, but only given enough to take one daily. I spoke to the pharmacist at  and they the Rx said a quantity of 5, but needed 10 pills. They will get 5 more pills for patient. Patient was aware that I was getting this corrected, she is to call with any further questions/concerns.

## 2022-09-08 NOTE — TELEPHONE ENCOUNTER
Patient called back, she would like to proceed with the Femara 5 mg as discussed. She would like to go ahead with the follicular usn as she is hoping to get a trigger shot if her usn looks good. Ultrasound and follow up scheduled. Ok to send in [address]?

## 2022-09-08 NOTE — TELEPHONE ENCOUNTER
I attempted to call patient and had to leave a message. Reviewed Giselle's response, she is instructed to call back to confirm if she would like to proceed with Clomid or Femara.

## 2022-09-08 NOTE — TELEPHONE ENCOUNTER
As last visit we had also discussed another round of clomid verses swap to Femara - I can send whichever she wants. Does she want a follicle scan? Looks like dx lap is scheduled - does she want to continue medications and TTC prior to surgery or hold off at this time?

## 2022-09-12 ENCOUNTER — TELEPHONE (OUTPATIENT)
Dept: OBGYN CLINIC | Age: 22
End: 2022-09-12

## 2022-09-12 NOTE — TELEPHONE ENCOUNTER
I completed patient's prior authorization on [employer]. Her upcoming procedure (67670, [id number]) does not require a PA.   Confirmation scanned into media

## 2022-09-19 ENCOUNTER — NURSE ONLY (OUTPATIENT)
Dept: OBGYN CLINIC | Age: 22
End: 2022-09-19
Payer: COMMERCIAL

## 2022-09-19 ENCOUNTER — OFFICE VISIT (OUTPATIENT)
Dept: OBGYN CLINIC | Age: 22
End: 2022-09-19
Payer: COMMERCIAL

## 2022-09-19 VITALS
WEIGHT: 231.8 LBS | DIASTOLIC BLOOD PRESSURE: 78 MMHG | BODY MASS INDEX: 35.13 KG/M2 | SYSTOLIC BLOOD PRESSURE: 110 MMHG | HEIGHT: 68 IN

## 2022-09-19 DIAGNOSIS — Z31.49 PROCREATION MANAGEMENT INVESTIGATION AND TESTING: Primary | ICD-10-CM

## 2022-09-19 PROCEDURE — 99213 OFFICE O/P EST LOW 20 MIN: CPT | Performed by: ADVANCED PRACTICE MIDWIFE

## 2022-09-19 RX ORDER — CYCLOBENZAPRINE HCL 10 MG
TABLET ORAL
COMMUNITY
Start: 2022-09-14 | End: 2022-11-02 | Stop reason: ALTCHOICE

## 2022-09-19 RX ORDER — CHORIONIC GONADOTROPIN 10000 UNIT
1 KIT INTRAMUSCULAR ONCE
Status: COMPLETED | OUTPATIENT
Start: 2022-09-19 | End: 2022-09-19

## 2022-09-19 RX ORDER — PREDNISONE 10 MG/1
TABLET ORAL
COMMUNITY
Start: 2022-09-14 | End: 2022-10-31 | Stop reason: ALTCHOICE

## 2022-09-19 RX ADMIN — CHORIONIC GONADOTROPIN 1 ML: KIT at 11:25

## 2022-09-19 NOTE — PROGRESS NOTES
presents with    Follow-up     Pt here for USN f/u. Pt denies concerns. Physical Exam  Constitutional:       Appearance: Normal appearance. She is obese. HENT:      Head: Normocephalic. Eyes:      Pupils: Pupils are equal, round, and reactive to light. Pulmonary:      Effort: Pulmonary effort is normal.   Musculoskeletal:         General: Normal range of motion. Cervical back: Normal range of motion. Neurological:      Mental Status: She is alert and oriented to person, place, and time. Skin:     General: Skin is warm and dry. Psychiatric:         Behavior: Behavior normal.   Vitals and nursing note reviewed. Vital Signs  Blood pressure 110/78, height 5' 8\" (1.727 m), weight 231 lb 12.8 oz (105.1 kg), last menstrual period 09/08/2022. Results reviewed today:    USN today:  Cycle day 12   Uterus 8.7 x 3.8 x 4.5cm  Endometrium 0.0TP  Dominant follicle on the right is 1.7 x 1.5 U9.4TB  Dominant follicle on the left is 2.0 x 1.8 x x2.0cm                              Assessment and Plan          Diagnosis Orders   1. Procreation management investigation and testing          Discussed options - they would like to proceed with trigger shot - called into [place], timed intercourse  If becomes pregnant - continue progesterone  If no pregnancy, call cycle day 1 to repeat medications - same dose. If abnormal/late menses then needs SPT prior to sending scripts. I am having José Davison start on Chorionic Gonadotropin. I am also having her maintain her Prenatal Vit-Fe Fumarate-FA (PRENATAL VITAMIN PO), progesterone, metFORMIN, predniSONE, and cyclobenzaprine. Return if symptoms worsen or fail to improve. She was also counseled on her preventative health maintenance recommendations and follow-up. There are no Patient Instructions on file for this visit.     DONATO Estrella CNM,9/19/2022 9:45 AM                     Electronically signed by DONATO Deleon - NATALI

## 2022-10-10 RX ORDER — LETROZOLE 2.5 MG/1
TABLET, FILM COATED ORAL
Qty: 10 TABLET | Refills: 0 | Status: SHIPPED | OUTPATIENT
Start: 2022-10-10

## 2022-10-20 ENCOUNTER — OFFICE VISIT (OUTPATIENT)
Dept: OBGYN CLINIC | Age: 22
End: 2022-10-20
Payer: COMMERCIAL

## 2022-10-20 VITALS
DIASTOLIC BLOOD PRESSURE: 72 MMHG | SYSTOLIC BLOOD PRESSURE: 110 MMHG | WEIGHT: 231.8 LBS | BODY MASS INDEX: 35.13 KG/M2 | HEIGHT: 68 IN

## 2022-10-20 DIAGNOSIS — Z31.49 PROCREATION MANAGEMENT INVESTIGATION AND TESTING: Primary | ICD-10-CM

## 2022-10-20 PROCEDURE — 99213 OFFICE O/P EST LOW 20 MIN: CPT | Performed by: ADVANCED PRACTICE MIDWIFE

## 2022-10-20 RX ORDER — CHORIONIC GONADOTROPIN 10000 UNIT
1 KIT INTRAMUSCULAR ONCE
Status: COMPLETED | OUTPATIENT
Start: 2022-10-20 | End: 2022-10-20

## 2022-10-20 RX ADMIN — CHORIONIC GONADOTROPIN 1 ML: KIT at 10:56

## 2022-10-20 ASSESSMENT — ENCOUNTER SYMPTOMS
GASTROINTESTINAL NEGATIVE: 1
RESPIRATORY NEGATIVE: 1

## 2022-10-20 NOTE — PROGRESS NOTES
PROBLEM VISIT     Date of service: 10/20/2022    Olya Merida  Is a 23 y.o.  female    PT's PCP is: Rita Harris MD     : 2000                                          HPI Here for USN f/u. This was round 2 of femara - did 3 of clomid in the past.  Has surgery with Dr Fabiana Masterson next month. Feeling well, denies concerns. Review of Systems   Constitutional: Negative. HENT: Negative. Respiratory: Negative. Gastrointestinal: Negative. Genitourinary: Negative. Neurological: Negative. Psychiatric/Behavioral: Negative. Patient's last menstrual period was 10/08/2022 (exact date). OB History    Para Term  AB Living   0 0 0 0 0 0   SAB IAB Ectopic Molar Multiple Live Births   0 0 0 0 0 0        Social History     Tobacco Use   Smoking Status Never   Smokeless Tobacco Never        Social History     Substance and Sexual Activity   Alcohol Use Yes    Comment: rare       Allergies: Pcn [penicillins]      Current Outpatient Medications:     Chorionic Gonadotropin 6000 units SOLR, Inject 6,000 Units as directed once for 1 dose, Disp: 1 each, Rfl: 0    letrozole (FEMARA) 2.5 MG tablet, Take 2 tabs daily (5mg) on cycle days 3-7., Disp: 10 tablet, Rfl: 0    predniSONE (DELTASONE) 10 MG tablet, take 4 tablets by mouth once daily for 2 days 3 tablets for 2 day. ..  (REFER TO PRESCRIPTION NOTES). , Disp: , Rfl:     cyclobenzaprine (FLEXERIL) 10 MG tablet, take 1 tablet by mouth twice a day if needed for PAIN/SPASMS.  NO DRIVING, Disp: , Rfl:     metFORMIN (GLUCOPHAGE-XR) 500 MG extended release tablet, take 1 tablet by mouth once daily WITH BREAKFAST FOR 1 WEEK ,INCREASE TO 2 TABLETS DAILY FOR 1 WEEK THEN 3 TABLETS DAILY FOR DURATION OF TREATMENT, Disp: 90 tablet, Rfl: 3    progesterone (ENDOMETRIN) 100 MG suppository, Cycle day 16 through onset of menses or if becomes pregnant then through first trimester of pregnancy, Disp: 30 suppository, Rfl: 3    Prenatal Vit-Fe Fumarate-FA (PRENATAL VITAMIN PO), Take by mouth, Disp: , Rfl:     Social History     Substance and Sexual Activity   Sexual Activity Yes    Partners: Male       Chief Complaint   Patient presents with    Follow-up     Pt here for day 10-12 USN f/u. Denies concerns. Physical Exam  Constitutional:       Appearance: Normal appearance. She is obese. HENT:      Head: Normocephalic. Eyes:      Pupils: Pupils are equal, round, and reactive to light. Pulmonary:      Effort: Pulmonary effort is normal.   Musculoskeletal:         General: Normal range of motion. Cervical back: Normal range of motion. Neurological:      Mental Status: She is alert and oriented to person, place, and time. Skin:     General: Skin is warm and dry. Psychiatric:         Behavior: Behavior normal.   Vitals and nursing note reviewed. Vital Signs  Blood pressure 110/72, height 5' 8\" (1.727 m), weight 231 lb 12.8 oz (105.1 kg), last menstrual period 10/08/2022. Results reviewed today:  USN today  Cycle day 12  Uterus 9.2 x 5.0 x 4.2cm  Endometrium 6.9mm  Small fibroid posterior to mid uterus which is <9AA  Dominant follicle on the right ovary 1.9 x 1.6 x 0.8YA  Largest folicle on the left is 1.5 x 1.4 x 1.4cm                              Assessment and Plan          Diagnosis Orders   1. Procreation management investigation and testing          Discussed options - desires trigger shot  If no pregnancy this month then take off next month due to surgery  She is agreeable. I am having John Reeves start on Chorionic Gonadotropin. I am also having her maintain her Prenatal Vit-Fe Fumarate-FA (PRENATAL VITAMIN PO), progesterone, metFORMIN, predniSONE, cyclobenzaprine, and letrozole. We administered chorionic gonadotropin. Return if symptoms worsen or fail to improve. She was also counseled on her preventative health maintenance recommendations and follow-up.      There are no Patient Instructions on file for this visit.     DONATO Mullen CNM,10/20/2022 12:44 PM                       Electronically signed by DONATO Mullen CNM

## 2022-11-02 ENCOUNTER — OFFICE VISIT (OUTPATIENT)
Dept: OBGYN CLINIC | Age: 22
End: 2022-11-02
Payer: COMMERCIAL

## 2022-11-02 VITALS — BODY MASS INDEX: 35.73 KG/M2 | WEIGHT: 235 LBS | SYSTOLIC BLOOD PRESSURE: 100 MMHG | DIASTOLIC BLOOD PRESSURE: 78 MMHG

## 2022-11-02 DIAGNOSIS — Z31.49 PROCREATION MANAGEMENT INVESTIGATION AND TESTING: ICD-10-CM

## 2022-11-02 DIAGNOSIS — N92.6 IRREGULAR MENSES: Primary | ICD-10-CM

## 2022-11-02 PROCEDURE — 99213 OFFICE O/P EST LOW 20 MIN: CPT | Performed by: OBSTETRICS & GYNECOLOGY

## 2022-11-02 NOTE — PROGRESS NOTES
DATE OF VISIT:  11/2/22    PATIENT NAME:  Cristobal Cavanaugh     YOB: 2000    REASON FOR VISIT:    Chief Complaint   Patient presents with    Pre-op Exam     Patient scheduled for dx lap, RAMON, AOE, chromopertubation  for tube patency on 11-7-2022. HISTORY OF PRESENT ILLNESS:  PT with long hx of irreg menses and infertility; concerned about endometriosis and tubal patency; disc'd dx lap with lysis of adhesions, ablation of endometriosis, chromopertubation; cycle should start the day after surgery so she should be good for ovulation induction this month      Patient's last menstrual period was 10/08/2022 (exact date). Vitals:    11/02/22 0817   BP: 100/78   Site: Left Upper Arm   Position: Sitting   Weight: 235 lb (106.6 kg)     Body mass index is 35.73 kg/m². Allergies   Allergen Reactions    Pcn [Penicillins] Hives and Shortness Of Breath     Current Outpatient Medications   Medication Sig Dispense Refill    metFORMIN (GLUCOPHAGE-XR) 500 MG extended release tablet take 1 tablet by mouth once daily WITH BREAKFAST FOR 1 WEEK ,INCREASE TO 2 TABLETS DAILY FOR 1 WEEK THEN 3 TABLETS DAILY FOR DURATION OF TREATMENT 90 tablet 3    progesterone (ENDOMETRIN) 100 MG suppository Cycle day 16 through onset of menses or if becomes pregnant then through first trimester of pregnancy 30 suppository 3    Prenatal Vit-Fe Fumarate-FA (PRENATAL VITAMIN PO) Take by mouth      Chorionic Gonadotropin 6000 units SOLR Inject 6,000 Units as directed once for 1 dose 1 each 0    letrozole (FEMARA) 2.5 MG tablet Take 2 tabs daily (5mg) on cycle days 3-7. (Patient not taking: Reported on 11/2/2022) 10 tablet 0     No current facility-administered medications for this visit.      Social History     Socioeconomic History    Marital status:      Spouse name: None    Number of children: None    Years of education: None    Highest education level: None   Tobacco Use    Smoking status: Never    Smokeless tobacco: Never Vaping Use    Vaping Use: Never used   Substance and Sexual Activity    Alcohol use: Yes     Comment: rare    Drug use: Never    Sexual activity: Yes     Partners: Male       REVIEW OF SYSTEMS:  Review of Systems   Constitutional:  Negative for chills and fever. Genitourinary:  Positive for menstrual problem. Negative for dysuria, pelvic pain and vaginal discharge. PHYSICAL EXAM:  /78 (Site: Left Upper Arm, Position: Sitting)   Wt 235 lb (106.6 kg)   LMP 10/08/2022 (Exact Date)   BMI 35.73 kg/m²   Physical Exam  Constitutional:       Appearance: Normal appearance. HENT:      Head: Normocephalic and atraumatic. Eyes:      Extraocular Movements: Extraocular movements intact. Pupils: Pupils are equal, round, and reactive to light. Cardiovascular:      Rate and Rhythm: Normal rate. Pulmonary:      Effort: Pulmonary effort is normal.   Musculoskeletal:         General: Normal range of motion. Neurological:      Mental Status: She is alert and oriented to person, place, and time. Skin:     General: Skin is warm and dry. Psychiatric:         Mood and Affect: Mood normal.         Behavior: Behavior normal.     The patient, Lord Lim is a 23 y.o. female, was seen with a total time spent of 20 minutes for the visit on this date of service by the E/M provider. The time component had both face to face and non face to face time spent in determining the total time component. Counseling and education regarding her diagnosis listed below and her options regarding those diagnoses were also included in determining her time component. Diagnosis Orders   1. Irregular menses        2. Procreation management investigation and testing             The patient had her preventative health maintenance recommendations and follow-up reviewed with her at the completion of her visit. ASSESSMENT:      1. Irregular menses    2.  Procreation management investigation and testing        PLAN:  No orders of the defined types were placed in this encounter. Return in about 1 week (around 11/9/2022) for dx lap, lysis of adhesions, ablation of endometriosis, chromopertubation.        Electronically signed by Kate Mars DO on 11/02/22

## 2022-11-04 ENCOUNTER — ANESTHESIA EVENT (OUTPATIENT)
Dept: OPERATING ROOM | Age: 22
End: 2022-11-04
Payer: COMMERCIAL

## 2022-11-07 ENCOUNTER — HOSPITAL ENCOUNTER (OUTPATIENT)
Age: 22
Setting detail: OUTPATIENT SURGERY
Discharge: HOME | End: 2022-11-07
Attending: OBSTETRICS & GYNECOLOGY | Admitting: OBSTETRICS & GYNECOLOGY
Payer: COMMERCIAL

## 2022-11-07 ENCOUNTER — ANESTHESIA (OUTPATIENT)
Dept: OPERATING ROOM | Age: 22
End: 2022-11-07
Payer: COMMERCIAL

## 2022-11-07 VITALS
HEIGHT: 68 IN | WEIGHT: 234.2 LBS | DIASTOLIC BLOOD PRESSURE: 66 MMHG | BODY MASS INDEX: 35.49 KG/M2 | OXYGEN SATURATION: 98 % | RESPIRATION RATE: 16 BRPM | TEMPERATURE: 97.2 F | SYSTOLIC BLOOD PRESSURE: 119 MMHG | HEART RATE: 89 BPM

## 2022-11-07 DIAGNOSIS — G89.18 POST-OP PAIN: Primary | ICD-10-CM

## 2022-11-07 LAB — HCG(URINE) PREGNANCY TEST: NEGATIVE

## 2022-11-07 PROCEDURE — 6360000002 HC RX W HCPCS

## 2022-11-07 PROCEDURE — A4216 STERILE WATER/SALINE, 10 ML: HCPCS | Performed by: NURSE ANESTHETIST, CERTIFIED REGISTERED

## 2022-11-07 PROCEDURE — 2580000003 HC RX 258: Performed by: NURSE ANESTHETIST, CERTIFIED REGISTERED

## 2022-11-07 PROCEDURE — 81025 URINE PREGNANCY TEST: CPT

## 2022-11-07 PROCEDURE — 2580000003 HC RX 258: Performed by: OBSTETRICS & GYNECOLOGY

## 2022-11-07 PROCEDURE — 2500000003 HC RX 250 WO HCPCS: Performed by: NURSE ANESTHETIST, CERTIFIED REGISTERED

## 2022-11-07 PROCEDURE — 64488 TAP BLOCK BI INJECTION: CPT | Performed by: NURSE ANESTHETIST, CERTIFIED REGISTERED

## 2022-11-07 PROCEDURE — 7100000001 HC PACU RECOVERY - ADDTL 15 MIN: Performed by: OBSTETRICS & GYNECOLOGY

## 2022-11-07 PROCEDURE — 49320 DIAG LAPARO SEPARATE PROC: CPT | Performed by: OBSTETRICS & GYNECOLOGY

## 2022-11-07 PROCEDURE — 3700000000 HC ANESTHESIA ATTENDED CARE: Performed by: OBSTETRICS & GYNECOLOGY

## 2022-11-07 PROCEDURE — 7100000011 HC PHASE II RECOVERY - ADDTL 15 MIN: Performed by: OBSTETRICS & GYNECOLOGY

## 2022-11-07 PROCEDURE — 3700000001 HC ADD 15 MINUTES (ANESTHESIA): Performed by: OBSTETRICS & GYNECOLOGY

## 2022-11-07 PROCEDURE — 6360000002 HC RX W HCPCS: Performed by: NURSE ANESTHETIST, CERTIFIED REGISTERED

## 2022-11-07 PROCEDURE — 6370000000 HC RX 637 (ALT 250 FOR IP): Performed by: NURSE ANESTHETIST, CERTIFIED REGISTERED

## 2022-11-07 PROCEDURE — 7100000010 HC PHASE II RECOVERY - FIRST 15 MIN: Performed by: OBSTETRICS & GYNECOLOGY

## 2022-11-07 PROCEDURE — 7100000000 HC PACU RECOVERY - FIRST 15 MIN: Performed by: OBSTETRICS & GYNECOLOGY

## 2022-11-07 PROCEDURE — 3600000014 HC SURGERY LEVEL 4 ADDTL 15MIN: Performed by: OBSTETRICS & GYNECOLOGY

## 2022-11-07 PROCEDURE — 6360000002 HC RX W HCPCS: Performed by: OBSTETRICS & GYNECOLOGY

## 2022-11-07 PROCEDURE — 2709999900 HC NON-CHARGEABLE SUPPLY: Performed by: OBSTETRICS & GYNECOLOGY

## 2022-11-07 PROCEDURE — 3600000004 HC SURGERY LEVEL 4 BASE: Performed by: OBSTETRICS & GYNECOLOGY

## 2022-11-07 PROCEDURE — 2720000010 HC SURG SUPPLY STERILE: Performed by: OBSTETRICS & GYNECOLOGY

## 2022-11-07 RX ORDER — ONDANSETRON 2 MG/ML
INJECTION INTRAMUSCULAR; INTRAVENOUS PRN
Status: DISCONTINUED | OUTPATIENT
Start: 2022-11-07 | End: 2022-11-07 | Stop reason: SDUPTHER

## 2022-11-07 RX ORDER — SODIUM CHLORIDE, SODIUM LACTATE, POTASSIUM CHLORIDE, CALCIUM CHLORIDE 600; 310; 30; 20 MG/100ML; MG/100ML; MG/100ML; MG/100ML
INJECTION, SOLUTION INTRAVENOUS ONCE
Status: COMPLETED | OUTPATIENT
Start: 2022-11-07 | End: 2022-11-07

## 2022-11-07 RX ORDER — FENTANYL CITRATE 50 UG/ML
INJECTION, SOLUTION INTRAMUSCULAR; INTRAVENOUS PRN
Status: DISCONTINUED | OUTPATIENT
Start: 2022-11-07 | End: 2022-11-07 | Stop reason: SDUPTHER

## 2022-11-07 RX ORDER — SODIUM CHLORIDE 0.9 % (FLUSH) 0.9 %
5-40 SYRINGE (ML) INJECTION PRN
Status: DISCONTINUED | OUTPATIENT
Start: 2022-11-07 | End: 2022-11-07 | Stop reason: HOSPADM

## 2022-11-07 RX ORDER — ROCURONIUM BROMIDE 10 MG/ML
INJECTION, SOLUTION INTRAVENOUS PRN
Status: DISCONTINUED | OUTPATIENT
Start: 2022-11-07 | End: 2022-11-07 | Stop reason: SDUPTHER

## 2022-11-07 RX ORDER — MIDAZOLAM HYDROCHLORIDE 1 MG/ML
INJECTION INTRAMUSCULAR; INTRAVENOUS PRN
Status: DISCONTINUED | OUTPATIENT
Start: 2022-11-07 | End: 2022-11-07 | Stop reason: SDUPTHER

## 2022-11-07 RX ORDER — GLYCOPYRROLATE 0.2 MG/ML
INJECTION INTRAMUSCULAR; INTRAVENOUS PRN
Status: DISCONTINUED | OUTPATIENT
Start: 2022-11-07 | End: 2022-11-07 | Stop reason: SDUPTHER

## 2022-11-07 RX ORDER — SODIUM CHLORIDE 9 MG/ML
INJECTION, SOLUTION INTRAVENOUS PRN
Status: DISCONTINUED | OUTPATIENT
Start: 2022-11-07 | End: 2022-11-07 | Stop reason: HOSPADM

## 2022-11-07 RX ORDER — KETOROLAC TROMETHAMINE 10 MG/1
10 TABLET, FILM COATED ORAL EVERY 6 HOURS PRN
Qty: 20 TABLET | Refills: 0 | Status: SHIPPED | OUTPATIENT
Start: 2022-11-07 | End: 2023-11-07

## 2022-11-07 RX ORDER — KETOROLAC TROMETHAMINE 30 MG/ML
INJECTION, SOLUTION INTRAMUSCULAR; INTRAVENOUS PRN
Status: DISCONTINUED | OUTPATIENT
Start: 2022-11-07 | End: 2022-11-07 | Stop reason: SDUPTHER

## 2022-11-07 RX ORDER — DIMENHYDRINATE 50 MG/1
50 TABLET ORAL ONCE
Status: COMPLETED | OUTPATIENT
Start: 2022-11-07 | End: 2022-11-07

## 2022-11-07 RX ORDER — SODIUM CHLORIDE 0.9 % (FLUSH) 0.9 %
5-40 SYRINGE (ML) INJECTION EVERY 12 HOURS SCHEDULED
Status: DISCONTINUED | OUTPATIENT
Start: 2022-11-07 | End: 2022-11-07 | Stop reason: HOSPADM

## 2022-11-07 RX ORDER — LIDOCAINE HYDROCHLORIDE 20 MG/ML
INJECTION, SOLUTION EPIDURAL; INFILTRATION; INTRACAUDAL; PERINEURAL PRN
Status: DISCONTINUED | OUTPATIENT
Start: 2022-11-07 | End: 2022-11-07 | Stop reason: SDUPTHER

## 2022-11-07 RX ORDER — ACETAMINOPHEN 325 MG/1
650 TABLET ORAL ONCE
Status: COMPLETED | OUTPATIENT
Start: 2022-11-07 | End: 2022-11-07

## 2022-11-07 RX ORDER — HYDROCODONE BITARTRATE AND ACETAMINOPHEN 5; 325 MG/1; MG/1
1 TABLET ORAL EVERY 6 HOURS PRN
Qty: 6 TABLET | Refills: 0 | Status: SHIPPED | OUTPATIENT
Start: 2022-11-07 | End: 2022-11-12

## 2022-11-07 RX ORDER — FENTANYL CITRATE 50 UG/ML
50 INJECTION, SOLUTION INTRAMUSCULAR; INTRAVENOUS EVERY 5 MIN PRN
Status: DISCONTINUED | OUTPATIENT
Start: 2022-11-07 | End: 2022-11-07 | Stop reason: HOSPADM

## 2022-11-07 RX ORDER — SODIUM CHLORIDE, SODIUM LACTATE, POTASSIUM CHLORIDE, CALCIUM CHLORIDE 600; 310; 30; 20 MG/100ML; MG/100ML; MG/100ML; MG/100ML
INJECTION, SOLUTION INTRAVENOUS CONTINUOUS PRN
Status: DISCONTINUED | OUTPATIENT
Start: 2022-11-07 | End: 2022-11-07 | Stop reason: SDUPTHER

## 2022-11-07 RX ORDER — SODIUM CHLORIDE 9 MG/ML
INJECTION INTRAVENOUS PRN
Status: DISCONTINUED | OUTPATIENT
Start: 2022-11-07 | End: 2022-11-07 | Stop reason: SDUPTHER

## 2022-11-07 RX ORDER — DEXAMETHASONE SODIUM PHOSPHATE 10 MG/ML
INJECTION, SOLUTION INTRAMUSCULAR; INTRAVENOUS PRN
Status: DISCONTINUED | OUTPATIENT
Start: 2022-11-07 | End: 2022-11-07 | Stop reason: SDUPTHER

## 2022-11-07 RX ORDER — ROPIVACAINE HYDROCHLORIDE 5 MG/ML
INJECTION, SOLUTION EPIDURAL; INFILTRATION; PERINEURAL PRN
Status: DISCONTINUED | OUTPATIENT
Start: 2022-11-07 | End: 2022-11-07

## 2022-11-07 RX ORDER — OXYCODONE HYDROCHLORIDE 5 MG/1
5 TABLET ORAL PRN
Status: COMPLETED | OUTPATIENT
Start: 2022-11-07 | End: 2022-11-07

## 2022-11-07 RX ORDER — DEXAMETHASONE SODIUM PHOSPHATE 4 MG/ML
INJECTION, SOLUTION INTRA-ARTICULAR; INTRALESIONAL; INTRAMUSCULAR; INTRAVENOUS; SOFT TISSUE PRN
Status: DISCONTINUED | OUTPATIENT
Start: 2022-11-07 | End: 2022-11-07 | Stop reason: SDUPTHER

## 2022-11-07 RX ORDER — ROPIVACAINE HYDROCHLORIDE 5 MG/ML
INJECTION, SOLUTION EPIDURAL; INFILTRATION; PERINEURAL PRN
Status: DISCONTINUED | OUTPATIENT
Start: 2022-11-07 | End: 2022-11-07 | Stop reason: SDUPTHER

## 2022-11-07 RX ORDER — ONDANSETRON 2 MG/ML
4 INJECTION INTRAMUSCULAR; INTRAVENOUS
Status: DISCONTINUED | OUTPATIENT
Start: 2022-11-07 | End: 2022-11-07 | Stop reason: HOSPADM

## 2022-11-07 RX ORDER — METOCLOPRAMIDE HYDROCHLORIDE 5 MG/ML
10 INJECTION INTRAMUSCULAR; INTRAVENOUS
Status: DISCONTINUED | OUTPATIENT
Start: 2022-11-07 | End: 2022-11-07 | Stop reason: HOSPADM

## 2022-11-07 RX ORDER — OXYCODONE HYDROCHLORIDE 5 MG/1
10 TABLET ORAL PRN
Status: COMPLETED | OUTPATIENT
Start: 2022-11-07 | End: 2022-11-07

## 2022-11-07 RX ORDER — NEOSTIGMINE METHYLSULFATE 1 MG/ML
INJECTION, SOLUTION INTRAVENOUS PRN
Status: DISCONTINUED | OUTPATIENT
Start: 2022-11-07 | End: 2022-11-07 | Stop reason: SDUPTHER

## 2022-11-07 RX ORDER — PROPOFOL 10 MG/ML
INJECTION, EMULSION INTRAVENOUS PRN
Status: DISCONTINUED | OUTPATIENT
Start: 2022-11-07 | End: 2022-11-07 | Stop reason: SDUPTHER

## 2022-11-07 RX ADMIN — OXYCODONE 10 MG: 5 TABLET ORAL at 15:55

## 2022-11-07 RX ADMIN — SODIUM CHLORIDE 30 ML: 9 INJECTION INTRAMUSCULAR; INTRAVENOUS; SUBCUTANEOUS at 13:20

## 2022-11-07 RX ADMIN — DEXAMETHASONE SODIUM PHOSPHATE 10 MG: 10 INJECTION, SOLUTION INTRAMUSCULAR; INTRAVENOUS at 13:20

## 2022-11-07 RX ADMIN — ROCURONIUM BROMIDE 40 MG: 10 INJECTION, SOLUTION INTRAVENOUS at 14:03

## 2022-11-07 RX ADMIN — ACETAMINOPHEN 650 MG: 325 TABLET ORAL at 12:44

## 2022-11-07 RX ADMIN — ROPIVACAINE HYDROCHLORIDE 60 ML: 5 INJECTION EPIDURAL; INFILTRATION; PERINEURAL at 13:20

## 2022-11-07 RX ADMIN — CEFAZOLIN 2000 MG: 10 INJECTION, POWDER, FOR SOLUTION INTRAVENOUS at 13:53

## 2022-11-07 RX ADMIN — PROPOFOL 180 MG: 10 INJECTION, EMULSION INTRAVENOUS at 14:03

## 2022-11-07 RX ADMIN — SODIUM CHLORIDE, POTASSIUM CHLORIDE, SODIUM LACTATE AND CALCIUM CHLORIDE: 600; 310; 30; 20 INJECTION, SOLUTION INTRAVENOUS at 13:59

## 2022-11-07 RX ADMIN — GLYCOPYRROLATE 0.6 MG: 0.2 INJECTION INTRAMUSCULAR; INTRAVENOUS at 14:56

## 2022-11-07 RX ADMIN — NEOSTIGMINE METHYLSULFATE 3 MG: 1 INJECTION INTRAVENOUS at 14:56

## 2022-11-07 RX ADMIN — DEXAMETHASONE SODIUM PHOSPHATE 4 MG: 4 INJECTION, SOLUTION INTRAMUSCULAR; INTRAVENOUS at 14:15

## 2022-11-07 RX ADMIN — FENTANYL CITRATE 100 MCG: 50 INJECTION INTRAMUSCULAR; INTRAVENOUS at 13:11

## 2022-11-07 RX ADMIN — MIDAZOLAM 2 MG: 1 INJECTION INTRAMUSCULAR; INTRAVENOUS at 13:11

## 2022-11-07 RX ADMIN — SODIUM CHLORIDE, POTASSIUM CHLORIDE, SODIUM LACTATE AND CALCIUM CHLORIDE: 600; 310; 30; 20 INJECTION, SOLUTION INTRAVENOUS at 13:05

## 2022-11-07 RX ADMIN — LIDOCAINE HYDROCHLORIDE 5 ML: 20 INJECTION, SOLUTION EPIDURAL; INFILTRATION; INTRACAUDAL; PERINEURAL at 14:03

## 2022-11-07 RX ADMIN — KETOROLAC TROMETHAMINE 30 MG: 30 INJECTION, SOLUTION INTRAMUSCULAR at 14:53

## 2022-11-07 RX ADMIN — ONDANSETRON 4 MG: 2 INJECTION INTRAMUSCULAR; INTRAVENOUS at 14:19

## 2022-11-07 RX ADMIN — DIMENHYDRINATE 50 MG: 50 TABLET ORAL at 12:44

## 2022-11-07 RX ADMIN — FENTANYL CITRATE 50 MCG: 50 INJECTION INTRAMUSCULAR; INTRAVENOUS at 14:34

## 2022-11-07 RX ADMIN — FENTANYL CITRATE 50 MCG: 50 INJECTION INTRAMUSCULAR; INTRAVENOUS at 14:52

## 2022-11-07 ASSESSMENT — PAIN DESCRIPTION - ORIENTATION: ORIENTATION: LOWER

## 2022-11-07 ASSESSMENT — PAIN - FUNCTIONAL ASSESSMENT
PAIN_FUNCTIONAL_ASSESSMENT: ACTIVITIES ARE NOT PREVENTED
PAIN_FUNCTIONAL_ASSESSMENT: NONE - DENIES PAIN

## 2022-11-07 ASSESSMENT — PAIN SCALES - GENERAL
PAINLEVEL_OUTOF10: 8
PAINLEVEL_OUTOF10: 0
PAINLEVEL_OUTOF10: 8
PAINLEVEL_OUTOF10: 6
PAINLEVEL_OUTOF10: 8

## 2022-11-07 ASSESSMENT — PAIN DESCRIPTION - LOCATION: LOCATION: ABDOMEN

## 2022-11-07 ASSESSMENT — PAIN DESCRIPTION - DESCRIPTORS: DESCRIPTORS: CRAMPING;PRESSURE

## 2022-11-07 ASSESSMENT — PAIN DESCRIPTION - PAIN TYPE: TYPE: SURGICAL PAIN

## 2022-11-07 ASSESSMENT — PAIN DESCRIPTION - FREQUENCY: FREQUENCY: INTERMITTENT

## 2022-11-07 NOTE — FLOWSHEET NOTE

## 2022-11-07 NOTE — ANESTHESIA PRE PROCEDURE
Department of Anesthesiology  Preprocedure Note       Name:  Cameron Melchor   Age:  23 y. o.  :  2000                                          MRN:  [id number]         Date:  2022      Surgeon: Abhijit Ceballos):  Agata Villatoro DO    Procedure: Procedure(s):  LAPAROSCOPY EXPLORATORY- DIAGNOSTIC , LYSIS OF ADHESIONS OF ENDOMETRIOSIS, CHROMOPERTUBATION    Medications prior to admission:   Prior to Admission medications    Medication Sig Start Date End Date Taking?  Authorizing Provider   Probiotic Product (PROBIOTIC-10 PO) Take 1 tablet by mouth   Yes Historical Provider, MD   Chorionic Gonadotropin 6000 units SOLR Inject 6,000 Units as directed once for 1 dose 10/20/22 10/20/22  DONATO David CNM   letrozole (FEMARA) 2.5 MG tablet Take 2 tabs daily (5mg) on cycle days 3-7. 10/10/22   DONATO David CNM   metFORMIN (GLUCOPHAGE-XR) 500 MG extended release tablet take 1 tablet by mouth once daily WITH BREAKFAST FOR 1 WEEK ,INCREASE TO 2 TABLETS DAILY FOR 1 WEEK THEN 3 TABLETS DAILY FOR DURATION OF TREATMENT 22   DONATO David CNM   progesterone (ENDOMETRIN) 100 MG suppository Cycle day 16 through onset of menses or if becomes pregnant then through first trimester of pregnancy 22   DONATO Clarke CNM   Prenatal Vit-Fe Fumarate-FA (PRENATAL VITAMIN PO) Take by mouth    Historical Provider, MD       Current medications:    Current Facility-Administered Medications   Medication Dose Route Frequency Provider Last Rate Last Admin    lactated ringers infusion   IntraVENous Once DONATO Gastelum CRNA        sodium chloride flush 0.9 % injection 5-40 mL  5-40 mL IntraVENous 2 times per day Cristina Reyes PA-C        sodium chloride flush 0.9 % injection 5-40 mL  5-40 mL IntraVENous PRN Cristina Reyes PA-C        0.9 % sodium chloride infusion   IntraVENous PRN Cristina Reyes PA-C        ceFAZolin (ANCEF) 2000 mg in dextrose 5 % 100 mL IVPB 2,000 mg IntraVENous On Call to Vivienne Ortiz PA-C           Allergies: Allergies   Allergen Reactions    Pcn [Penicillins] Hives and Shortness Of Breath       Problem List:  There is no problem list on file for this patient. Past Medical History:        Diagnosis Date    PCOS (polycystic ovarian syndrome)        Past Surgical History:        Procedure Laterality Date    CYST REMOVAL      head       Social History:    Social History     Tobacco Use    Smoking status: Never    Smokeless tobacco: Never   Substance Use Topics    Alcohol use: Yes     Comment: rare                                Counseling given: Not Answered      Vital Signs (Current):   Vitals:    10/31/22 1240 11/07/22 1215 11/07/22 1235   BP:   128/76   Pulse:   75   Resp:   16   Temp:   36.6 °C (97.8 °F)   TempSrc:   Temporal   SpO2:   99%   Weight: 230 lb (104.3 kg) 234 lb 3.2 oz (106.2 kg)    Height: 5' 8\" (1.727 m) 5' 8\" (1.727 m)                                               BP Readings from Last 3 Encounters:   11/07/22 128/76   11/02/22 100/78   10/20/22 110/72       NPO Status: Time of last liquid consumption: 0600 (sip of water)                        Time of last solid consumption: 2000                        Date of last liquid consumption: 11/07/22                        Date of last solid food consumption: 11/06/22    BMI:   Wt Readings from Last 3 Encounters:   11/07/22 234 lb 3.2 oz (106.2 kg)   11/02/22 235 lb (106.6 kg)   10/20/22 231 lb 12.8 oz (105.1 kg)     Body mass index is 35.61 kg/m². CBC: No results found for: WBC, RBC, HGB, HCT, MCV, RDW, PLT    CMP: No results found for: NA, K, CL, CO2, BUN, CREATININE, GFRAA, AGRATIO, LABGLOM, GLUCOSE, GLU, PROT, CALCIUM, BILITOT, ALKPHOS, AST, ALT    POC Tests: No results for input(s): POCGLU, POCNA, POCK, POCCL, POCBUN, POCHEMO, POCHCT in the last 72 hours.     Coags: No results found for: PROTIME, INR, APTT    HCG (If Applicable):   Lab Results   Component Value Date    PREGTESTUR NEGATIVE 11/07/2022    HCGQUANT <1 03/30/2022        ABGs: No results found for: PHART, PO2ART, TYI8TNO, OER5PMF, BEART, Z8EUAQCV     Type & Screen (If Applicable):  No results found for: LABABO, LABRH    Drug/Infectious Status (If Applicable):  No results found for: HIV, HEPCAB    COVID-19 Screening (If Applicable): No results found for: COVID19        Anesthesia Evaluation  Patient summary reviewed and Nursing notes reviewed no history of anesthetic complications:   Airway: Mallampati: I  TM distance: >3 FB   Neck ROM: full  Mouth opening: > = 3 FB   Dental: normal exam         Pulmonary:Negative Pulmonary ROS and normal exam                               Cardiovascular:Negative CV ROS  Exercise tolerance: good (>4 METS),                     Neuro/Psych:   Negative Neuro/Psych ROS              GI/Hepatic/Renal: Neg GI/Hepatic/Renal ROS            Endo/Other:    (+) : arthritis: OA., .                 Abdominal:   (+) obese,           Vascular: negative vascular ROS. Other Findings:           Anesthesia Plan      general and regional     ASA 2       Induction: intravenous. Anesthetic plan and risks discussed with patient. Use of blood products discussed with patient whom consented to blood products. Plan discussed with CRNA.           Post-op pain plan if not by surgeon: single peripheral nerve block            DONATO Roque - CRNA   11/7/2022

## 2022-11-07 NOTE — H&P
HISTORY AND PHYSICAL             Date: 11/7/2022        Patient Name: Víctor Shine     YOB: 2000      Age:  23 y.o. Chief Complaint   No chief complaint on file. History Obtained From   patient    History of Present Illness   PT with long hx of irreg menses and infertility; concerned about endometriosis and tubal patency; disc'd dx lap with lysis of adhesions, ablation of endometriosis, chromopertubation    Past Medical History     Past Medical History:   Diagnosis Date    PCOS (polycystic ovarian syndrome)         Past Surgical History     Past Surgical History:   Procedure Laterality Date    CYST REMOVAL      head        Medications Prior to Admission     Prior to Admission medications    Medication Sig Start Date End Date Taking?  Authorizing Provider   Probiotic Product (PROBIOTIC-10 PO) Take 1 tablet by mouth   Yes Historical Provider, MD   Chorionic Gonadotropin 6000 units SOLR Inject 6,000 Units as directed once for 1 dose 10/20/22 10/20/22  DONATO David CNM   letrozole ([address] [facility]) 2.5 MG tablet Take 2 tabs daily (5mg) on cycle days 3-7. 10/10/22   DONATO David CNM   metFORMIN (GLUCOPHAGE-XR) 500 MG extended release tablet take 1 tablet by mouth once daily WITH BREAKFAST FOR 1 WEEK ,INCREASE TO 2 TABLETS DAILY FOR 1 WEEK THEN 3 TABLETS DAILY FOR DURATION OF TREATMENT 8/8/22   DONATO David CNM   progesterone (ENDOMETRIN) 100 MG suppository Cycle day 16 through onset of menses or if becomes pregnant then through first trimester of pregnancy 7/11/22   Fredi Bence, APRN - CNM   Prenatal Vit-Fe Fumarate-FA (PRENATAL VITAMIN PO) Take by mouth    Historical Provider, MD        Allergies   Pcn [penicillins]    Social History     Social History       Tobacco History       Smoking Status  Never      Smokeless Tobacco Use  Never              Alcohol History       Alcohol Use Status  Yes Comment  rare              Drug Use       Drug Use Status  Never Sexual Activity       Sexually Active  Yes Partners  Male                    Family History     Family History   Problem Relation Age of Onset    Lung Cancer Paternal Grandfather     Heart Failure Maternal Grandfather     Polycystic Ovary Syndrome Sister        Review of Systems   Review of Systems   Constitutional:  Negative for chills and fever. Genitourinary:  Positive for menstrual problem. Negative for dysuria and vaginal discharge. Physical Exam   /76   Pulse 75   Temp 97.8 °F (36.6 °C) (Temporal)   Resp 16   Ht 5' 8\" (1.727 m)   Wt 234 lb 3.2 oz (106.2 kg)   LMP 10/08/2022 (Exact Date)   SpO2 99%   BMI 35.61 kg/m²     Physical Exam  Constitutional:       Appearance: Normal appearance. HENT:      Head: Normocephalic and atraumatic. Eyes:      Extraocular Movements: Extraocular movements intact. Pupils: Pupils are equal, round, and reactive to light. Cardiovascular:      Rate and Rhythm: Normal rate. Pulmonary:      Effort: Pulmonary effort is normal.   Musculoskeletal:         General: Normal range of motion. Cervical back: Normal range of motion. Skin:     General: Skin is warm and dry. Neurological:      Mental Status: She is alert and oriented to person, place, and time. Psychiatric:         Mood and Affect: Mood normal.         Behavior: Behavior normal.         Thought Content: Thought content normal.         Judgment: Judgment normal.       Labs      Recent Results (from the past 24 hour(s))   Pregnancy, Urine    Collection Time: 11/07/22 12:19 PM   Result Value Ref Range    HCG(Urine) Pregnancy Test NEGATIVE NEGATIVE        Imaging/Diagnostics Last 24 Hours   No results found.     Assessment      Irregular menstruation  PCOS  Plan   Diagnostic laparoscopy poss lysis of adhesions/ablation of endometriosis/chromopertubation    Consultations Ordered:  None    Electronically signed by Kate Mars DO on 11/7/22 at 1:33 PM EST

## 2022-11-07 NOTE — OP NOTE
Preoperative diagnosis:  1. Irregular menstruation  2. PCOS    Postoperative diagnosis:  1. Stenotic cervix  2. Adenomyosis    Procedure: Diagnostic laparoscopy with ablation of endometriosis and peritoneal biopsy    Surgeon: Dr. Oswald Booth    Asst.: Gonzalo Monday PGY 2    Anesthesia: Gen. EBL: Less than 10 mL    Fluids: LR    Urine: Bladder drained prior to surgery    Specimens: Peritoneal biopsy    Findings: The patient had an anteverted uterus which sounded to 8 cm in depth. The uterus was adenomyotic in appearance. The cervix was stenotic and the spill through the tubes took 30 mL but was successful. Both ovaries and tubes appeared to be grossly within normal limits. There were no pelvic adhesions noted. Condition: Stable    Complications: None    Operative note: The patient was taken to the operating room where she was prepped and draped in usual sterile fashion in a dorsal lithotomy position. A weighted speculum was placed in the patient's vagina and anterior lip of the cervix was grasped with a single-tooth tenaculum. The cervix was stenotic and a sponge stick was placed until intraabdominal guidance of the cervical dilation could occur. Attention was then turned patient's abdomen where an infraumbilical incision was made. A Veress needle was carefully introduced at a 45° angle tenting the abdominal wall. Intra-abdominal placement was confirmed by use of water-filled syringe and drop in intra-abdominal pressure with insufflation of CO2. Pneumoperitoneum was obtained with about 2-1/2 L of CO2. A 5 mm step port was then placed without difficulty and intra-abdominal placement was confirmed by laparoscopy. A second port was then placed approximately 4 cm inferior and 5 cm lateral to the first on the right . We placed another 5 mm port here. There were no adhesions now implants of endometriosis. The uterus was adenomyotic in appearance.   After inspection of the patient's pelvis, I turned my attention to dilating the cervix under visualization. Once this was completed the uterine manipulator/catheter was introduced. The dye was introduced and after the second syringe (20 mL) spill was noted on the right. After another 10 mL was pushed spill was noted on the left. Once hemostasis was assured, the instruments were removed from the abdomen. The abdomen was allowed to desufflate. The skin incisions were closed with 4-0 Monocryl. Sponge, lap, needle, and instrument counts were correct ×2. The patient was taken to the recovery area in apparently stable condition.

## 2022-11-07 NOTE — DISCHARGE INSTRUCTIONS
SAME DAY SURGERY DISCHARGE INSTRUCTIONS    1. Do not drive or operate hazardous machinery for 24 hours. 2.  Do not make important personal or business decisions for 24 hours. 3.  Do not drink alcoholic beverages for 24 hours. 4.  Do not smoke tobacco products for 24 hours. 5.  Eat light foods (Jell-O, soups, etc....) and drink plenty of fluids (water, Sprite, etc...) up to 8 glasses per day, as you can tolerate. 6.  If your bandages become soaked with bright red blood, place another dressing pad over your bandages. (DO NOT remove original bandage.)  Call your surgeon for further instructions. A small amount of bright red blood is to be expected. 7.  You may remove your dressing the morning following surgery; leave the steri-strips in place, they will fall off on their own. If they have not fallen off in 7-10 days, please remove them. 8.  If no drainage from incisions you may shower. 9.  Limit your activities for 24 hours. Do not engage in heavy work until your surgeon gives you permission. DO NOT lift anything heavier than 10 pounds. You may go up & down stairs and do any activity that can be done comfortably. 10.  Report the following signs or any questions regarding your physical condition to your surgeon immediately:    Excessive swelling of, or around the wound area. Redness or pus-like drainage    Temperature of 100 degrees (F) or above. Excessive pain. If unable to urinate 4 hours after surgery. If bleeding at surgery site continues after 5-10 minutes of pressure. 11.  Pain Control:  Take pain meds as prescribed. You may use over the counter meds like Acetaminophen or Ibuprofen if not part of the meds already prescribed. While on narcotic pain meds DO NOT drive, operate machinery or make business decisions. 12.  Try to avoid constipation (no bowel movement) by using over the counter Colace once or twice daily and increasing your fluid intake.   Please call if no bowel movement after increasing fluid intake, use of Milk of Magnesia, Pericolace (laxative) or Dulcolax suppositories. 13. No sexual activity, tampons, douches, sitting in hot tubs/saunas or swimming in pools/ponds for 2 weeks or until cleared by your surgeon. 15.  Call your surgeon for any questions regarding your surgery. 15.  Call for an appointment to see your surgeon in 4 weeks.    Dr. Billie Borrero -- [place] office      [phone]      Roxanne [facility] office   [phone]

## 2022-11-07 NOTE — ANESTHESIA PROCEDURE NOTES
Peripheral Block    Patient location during procedure: pre-op  Reason for block: post-op pain management and at surgeon's request  Start time: 11/7/2022 1:10 PM  End time: 11/7/2022 1:20 PM  Staffing  Performed: resident/CRNA   Resident/CRNA: DONATO Palumbo CRNA  Preanesthetic Checklist  Completed: patient identified, IV checked, site marked, risks and benefits discussed, surgical/procedural consents, equipment checked, pre-op evaluation, timeout performed, anesthesia consent given, oxygen available, monitors applied/VS acknowledged and fire risk safety assessment completed and verbalized  Peripheral Block   Patient position: supine  Prep: ChloraPrep  Provider prep: sterile gloves and mask  Patient monitoring: cardiac monitor, continuous pulse ox, IV access and frequent blood pressure checks  Block type: TAP and Rectus sheath  Laterality: bilateral  Injection technique: single-shot  Guidance: ultrasound guided  Local infiltration: decadron and ropivacaine  Infiltration strength: 0.5 %  Local infiltration: decadron and ropivacaine  Dose: 60 mL    Needle   Needle type:  Other   Needle gauge: 22 G  Needle localization: ultrasound guidance  Needle length: 11 cmOther needle type: [place]  Assessment   Injection assessment: negative aspiration for heme, no paresthesia on injection, local visualized surrounding nerve on ultrasound and no intravascular symptoms  Paresthesia pain: none  Slow fractionated injection: yes  Hemodynamics: stable  Real-time US image taken/store: yes  Outcomes: uncomplicated and patient tolerated procedure well    Additional Notes  0.5% Ropivacaine 60ml, PFNS 30ml, PF Decadron 10mg

## 2022-11-09 RX ORDER — LETROZOLE 2.5 MG/1
TABLET, FILM COATED ORAL
Qty: 10 TABLET | Refills: 0 | Status: SHIPPED | OUTPATIENT
Start: 2022-11-09

## 2022-11-09 NOTE — TELEPHONE ENCOUNTER
Pt called back and was wondering what she should do since her day 10-12 is next week when KYEL Tompkins is out. Pt was hoping to get a trigger shot this month. Please advise.

## 2022-11-10 NOTE — TELEPHONE ENCOUNTER
Per KYLE Simon okay to have follicle scan next week and f/I with Kelly Cha can you please call and schedule patient.

## 2022-11-17 ENCOUNTER — OFFICE VISIT (OUTPATIENT)
Dept: OBGYN CLINIC | Age: 22
End: 2022-11-17
Payer: COMMERCIAL

## 2022-11-17 VITALS — BODY MASS INDEX: 35.28 KG/M2 | WEIGHT: 232 LBS | SYSTOLIC BLOOD PRESSURE: 119 MMHG | DIASTOLIC BLOOD PRESSURE: 78 MMHG

## 2022-11-17 DIAGNOSIS — N92.6 IRREGULAR MENSES: Primary | ICD-10-CM

## 2022-11-17 DIAGNOSIS — E28.2 PCOS (POLYCYSTIC OVARIAN SYNDROME): ICD-10-CM

## 2022-11-17 PROCEDURE — 99213 OFFICE O/P EST LOW 20 MIN: CPT

## 2022-11-19 NOTE — PROGRESS NOTES
DATE OF VISIT:  11/18/22    PATIENT NAME:  Angela Cavanaugh     YOB: 2000    REASON FOR VISIT:    Chief Complaint   Patient presents with    Follow-up     Follow up follicle scan-day 10        HISTORY OF PRESENT ILLNESS:  Patient present for day 10 follicle scan and post-op visit. Feeling well s/p dx lap, chromopertubation. Denies spotting, discharge. Abdominal incisions healing well. USN:  Uterus measures 9.6 x 5.1 x 3.8 cm  Endometrium measures 7.4 mm  Intramural fibroid posterior mid uterus measuring 0.8 x 0.4 x 0.5 cm  Largest follicle on right ovary measures 1.0 x 0.7 x 1.0 cm  Largest follicle on left ovary measures 1.4 x 1.5 x 1.5 cm      Patient's last menstrual period was 11/08/2022. Vitals:    11/17/22 1053   BP: 119/78   Weight: 232 lb (105.2 kg)     Body mass index is 35.28 kg/m². Allergies   Allergen Reactions    Pcn [Penicillins] Hives and Shortness Of Breath     Current Outpatient Medications   Medication Sig Dispense Refill    letrozole (FEMARA) 2.5 MG tablet Take 2 tabs daily (5mg) on cycle days 3-7. 10 tablet 0    Probiotic Product (PROBIOTIC-10 PO) Take 1 tablet by mouth      ketorolac (TORADOL) 10 MG tablet Take 1 tablet by mouth every 6 hours as needed for Pain 20 tablet 0    Chorionic Gonadotropin 6000 units SOLR Inject 6,000 Units as directed once for 1 dose 1 each 0    metFORMIN (GLUCOPHAGE-XR) 500 MG extended release tablet take 1 tablet by mouth once daily WITH BREAKFAST FOR 1 WEEK ,INCREASE TO 2 TABLETS DAILY FOR 1 WEEK THEN 3 TABLETS DAILY FOR DURATION OF TREATMENT 90 tablet 3    progesterone (ENDOMETRIN) 100 MG suppository Cycle day 16 through onset of menses or if becomes pregnant then through first trimester of pregnancy 30 suppository 3    Prenatal Vit-Fe Fumarate-FA (PRENATAL VITAMIN PO) Take by mouth       No current facility-administered medications for this visit.      Social History     Socioeconomic History    Marital status:      Spouse name: None Number of children: None    Years of education: None    Highest education level: None   Tobacco Use    Smoking status: Never    Smokeless tobacco: Never   Vaping Use    Vaping Use: Never used   Substance and Sexual Activity    Alcohol use: Yes     Comment: rare    Drug use: Never    Sexual activity: Yes     Partners: Male       REVIEW OF SYSTEMS:  Review of Systems   Constitutional:  Negative for chills, fatigue and fever. Genitourinary:  Negative for dysuria, frequency, menstrual problem, pelvic pain, vaginal bleeding and vaginal discharge. PHYSICAL EXAM:  /78   Wt 232 lb (105.2 kg)   LMP 11/08/2022   BMI 35.28 kg/m²   Physical Exam  Constitutional:       Appearance: Normal appearance. HENT:      Head: Normocephalic and atraumatic. Mouth/Throat:      Mouth: Mucous membranes are moist.   Eyes:      Extraocular Movements: Extraocular movements intact. Musculoskeletal:         General: Normal range of motion. Cervical back: Normal range of motion. Neurological:      General: No focal deficit present. Mental Status: She is alert and oriented to person, place, and time. Skin:     General: Skin is warm and dry. Psychiatric:         Mood and Affect: Mood normal.         Behavior: Behavior normal.         Thought Content: Thought content normal.     The patient, Elizabeth Molina is a 23 y.o. female, was seen with a total time spent of 20 minutes for the visit on this date of service by the E/M provider. The time component had both face to face and non face to face time spent in determining the total time component. Counseling and education regarding her diagnosis listed below and her options regarding those diagnoses were also included in determining her time component. The patient had her preventative health maintenance recommendations and follow-up reviewed with her at the completion of her visit. ASSESSMENT:  1. Irregular menses    2.  PCOS (polycystic ovarian syndrome) PLAN:  Reviewed USN with patient - no dominant follicle, largest follicle <2 cm. Reviewed findings of procedure. Patient elects to skip trigger shot this month and allow body to rest.  Discussed trial of timed intercourse and instructions for next cycle/positive pregnancy test.    No orders of the defined types were placed in this encounter. Return if symptoms worsen or fail to improve.        Electronically signed by Live Campos PA-C on 11/18/22

## 2022-12-12 DIAGNOSIS — N97.9 INFERTILITY, FEMALE: Primary | ICD-10-CM

## 2022-12-12 RX ORDER — LETROZOLE 2.5 MG/1
TABLET, FILM COATED ORAL
Qty: 10 TABLET | Refills: 0 | Status: SHIPPED | OUTPATIENT
Start: 2022-12-12

## 2022-12-20 ENCOUNTER — OFFICE VISIT (OUTPATIENT)
Dept: OBGYN CLINIC | Age: 22
End: 2022-12-20

## 2022-12-20 VITALS
WEIGHT: 234.6 LBS | BODY MASS INDEX: 35.55 KG/M2 | HEIGHT: 68 IN | SYSTOLIC BLOOD PRESSURE: 120 MMHG | DIASTOLIC BLOOD PRESSURE: 80 MMHG

## 2022-12-20 DIAGNOSIS — Z31.49 PROCREATION MANAGEMENT INVESTIGATION AND TESTING: Primary | ICD-10-CM

## 2022-12-20 RX ORDER — CHORIONIC GONADOTROPIN 10000 UNIT
1 KIT INTRAMUSCULAR ONCE
Status: COMPLETED | OUTPATIENT
Start: 2022-12-20 | End: 2022-12-20

## 2022-12-20 RX ADMIN — CHORIONIC GONADOTROPIN 1 ML: KIT at 14:03

## 2022-12-20 ASSESSMENT — ENCOUNTER SYMPTOMS
GASTROINTESTINAL NEGATIVE: 1
RESPIRATORY NEGATIVE: 1

## 2022-12-20 NOTE — PROGRESS NOTES
PROBLEM VISIT     Date of service: 2022    Deena Bello  Is a 23 y.o.  female    PT's PCP is: Allie Gaspar MD     : 2000                                          HPI Here for USN f/u. This cycle did Femara 5mg cycle day 3-7, currently cycle day 11. Denies changes/concerns. Spouse is with her today    Review of Systems   Constitutional: Negative. HENT: Negative. Respiratory: Negative. Gastrointestinal: Negative. Genitourinary: Negative. Neurological: Negative. Psychiatric/Behavioral: Negative. Patient's last menstrual period was 12/10/2022 (exact date). OB History    Para Term  AB Living   0 0 0 0 0 0   SAB IAB Ectopic Molar Multiple Live Births   0 0 0 0 0 0        Social History     Tobacco Use   Smoking Status Never   Smokeless Tobacco Never        Social History     Substance and Sexual Activity   Alcohol Use Yes    Comment: rare       Allergies: Pcn [penicillins]      Current Outpatient Medications:     letrozole (FEMARA) 2.5 MG tablet, Take 2 tabs daily (5mg) on cycle days 3-7., Disp: 10 tablet, Rfl: 0    Probiotic Product (PROBIOTIC-10 PO), Take 1 tablet by mouth, Disp: , Rfl:     ketorolac (TORADOL) 10 MG tablet, Take 1 tablet by mouth every 6 hours as needed for Pain, Disp: 20 tablet, Rfl: 0    metFORMIN (GLUCOPHAGE-XR) 500 MG extended release tablet, take 1 tablet by mouth once daily WITH BREAKFAST FOR 1 WEEK ,INCREASE TO 2 TABLETS DAILY FOR 1 WEEK THEN 3 TABLETS DAILY FOR DURATION OF TREATMENT, Disp: 90 tablet, Rfl: 3    Prenatal Vit-Fe Fumarate-FA (PRENATAL VITAMIN PO), Take by mouth, Disp: , Rfl:     Chorionic Gonadotropin 6000 units SOLR, Inject 6,000 Units as directed once for 1 dose, Disp: 1 each, Rfl: 0    Social History     Substance and Sexual Activity   Sexual Activity Yes    Partners: Male       Chief Complaint   Patient presents with    Other     Pt here for day 11 USN f/u. Pt denies concerns.         Physical Exam  Constitutional:       Appearance: Normal appearance. She is obese. HENT:      Head: Normocephalic. Eyes:      Pupils: Pupils are equal, round, and reactive to light. Pulmonary:      Effort: Pulmonary effort is normal.   Musculoskeletal:         General: Normal range of motion. Cervical back: Normal range of motion. Neurological:      Mental Status: She is alert and oriented to person, place, and time. Skin:     General: Skin is warm and dry. Psychiatric:         Behavior: Behavior normal.   Vitals and nursing note reviewed. Vital Signs  Blood pressure 120/80, height 5' 8\" (1.727 m), weight 234 lb 9.6 oz (106.4 kg), last menstrual period 12/10/2022. Results reviewed today:    USN today  Uterus is 9.5 x 4.8 x 3.9cm  Uterus appears sightly adenomyotic  Endometrium 7.6mm  Intramural fibroid posterior/mid - 0.8 x 0.6 x 3.6WR  Largest follicle on the right 1.9 x 1.3 x 0.6NR  Largest follicle on the left 1.7 x 1.5 xx 1.7cm                              Assessment and Plan          Diagnosis Orders   1. Procreation management investigation and testing            Reviewed options - desires trigger shot today  Call with + HPT or first day of menses. I am having Kei Nash maintain her Prenatal Vit-Fe Fumarate-FA (PRENATAL VITAMIN PO), metFORMIN, Chorionic Gonadotropin, Probiotic Product (PROBIOTIC-10 PO), ketorolac, and letrozole. No follow-ups on file. She was also counseled on her preventative health maintenance recommendations and follow-up. There are no Patient Instructions on file for this visit.     DONATO Prabhakar CNM,12/20/2022 1:20 PM                     Electronically signed by DONATO Prabhakar CNM

## 2023-01-03 ENCOUNTER — HOSPITAL ENCOUNTER (OUTPATIENT)
Age: 23
Setting detail: SPECIMEN
Discharge: HOME | End: 2023-01-03

## 2023-01-03 ENCOUNTER — TELEPHONE (OUTPATIENT)
Dept: OBGYN CLINIC | Age: 23
End: 2023-01-03

## 2023-01-03 DIAGNOSIS — N91.2 AMENORRHEA: ICD-10-CM

## 2023-01-03 DIAGNOSIS — N91.2 AMENORRHEA: Primary | ICD-10-CM

## 2023-01-03 NOTE — TELEPHONE ENCOUNTER
Pt had positive pregnancy test over the weekend and wondering what next steps are? Do you want quant and progesterone level? no concerns

## 2023-01-03 NOTE — TELEPHONE ENCOUNTER
Pt aware- LMP 12/8. Will come in today for HCG only as she has been using Progesterone supp.  Order placed

## 2023-01-03 NOTE — TELEPHONE ENCOUNTER
HCG level with repeat at 48 hour jim. Should already be doing progesterone and if so continue this daily. If was not using her progesterone then draw a level please.

## 2023-01-04 DIAGNOSIS — N91.2 AMENORRHEA: Primary | ICD-10-CM

## 2023-01-04 LAB — HCG QUANTITATIVE: 46 MIU/ML

## 2023-01-05 ENCOUNTER — HOSPITAL ENCOUNTER (OUTPATIENT)
Age: 23
Setting detail: SPECIMEN
Discharge: HOME | End: 2023-01-05

## 2023-01-05 DIAGNOSIS — N91.2 AMENORRHEA: ICD-10-CM

## 2023-01-05 LAB — HCG QUANTITATIVE: 81 MIU/ML

## 2023-01-31 ENCOUNTER — OFFICE VISIT (OUTPATIENT)
Dept: OBGYN CLINIC | Age: 23
End: 2023-01-31
Payer: COMMERCIAL

## 2023-01-31 VITALS
DIASTOLIC BLOOD PRESSURE: 64 MMHG | WEIGHT: 241 LBS | HEIGHT: 68 IN | SYSTOLIC BLOOD PRESSURE: 90 MMHG | BODY MASS INDEX: 36.53 KG/M2

## 2023-01-31 DIAGNOSIS — N91.1 AMENORRHEA, SECONDARY: Primary | ICD-10-CM

## 2023-01-31 PROCEDURE — 99213 OFFICE O/P EST LOW 20 MIN: CPT | Performed by: ADVANCED PRACTICE MIDWIFE

## 2023-01-31 ASSESSMENT — ENCOUNTER SYMPTOMS
GASTROINTESTINAL NEGATIVE: 1
RESPIRATORY NEGATIVE: 1

## 2023-01-31 NOTE — PROGRESS NOTES
PROBLEM VISIT     Date of service: 2023    Dhara Sepulveda  Is a 23 y.o.  female    PT's PCP is: Candelario Hill MD     : 2000                                          HPI Here for viability USN. With last cycle did femara and trigger shot. Conceived!!! Feeling well - having some breast tenderness and heightened gag reflux . Review of Systems   Constitutional: Negative. HENT: Negative. Respiratory: Negative. Gastrointestinal: Negative. Genitourinary:  Positive for menstrual problem (amenorrhea). Neurological: Negative. Psychiatric/Behavioral: Negative. Patient's last menstrual period was 12/10/2022 (exact date). OB History    Para Term  AB Living   1 0 0 0 0 0   SAB IAB Ectopic Molar Multiple Live Births   0 0 0 0 0 0      # Outcome Date GA Lbr Jignesh/2nd Weight Sex Delivery Anes PTL Lv   1 Current                 Social History     Tobacco Use   Smoking Status Never   Smokeless Tobacco Never        Social History     Substance and Sexual Activity   Alcohol Use Yes    Comment: rare       Allergies: Pcn [penicillins]      Current Outpatient Medications:     progesterone (ENDOMETRIN) 100 MG suppository, Place 1 suppository vaginally daily, Disp: 30 suppository, Rfl: 2    Prenatal Vit-Fe Fumarate-FA (PRENATAL VITAMIN PO), Take by mouth, Disp: , Rfl:     Probiotic Product (PROBIOTIC-10 PO), Take 1 tablet by mouth (Patient not taking: Reported on 2023), Disp: , Rfl:     metFORMIN (GLUCOPHAGE-XR) 500 MG extended release tablet, take 1 tablet by mouth once daily WITH BREAKFAST FOR 1 WEEK ,INCREASE TO 2 TABLETS DAILY FOR 1 WEEK THEN 3 TABLETS DAILY FOR DURATION OF TREATMENT (Patient not taking: Reported on 2023), Disp: 90 tablet, Rfl: 3    Social History     Substance and Sexual Activity   Sexual Activity Yes    Partners: Male       Chief Complaint   Patient presents with    Amenorrhea     Pt here for viability USN f/u. Pt denies concerns. Physical Exam  Constitutional:       Appearance: Normal appearance. She is obese. HENT:      Head: Normocephalic. Eyes:      Pupils: Pupils are equal, round, and reactive to light. Pulmonary:      Effort: Pulmonary effort is normal.   Musculoskeletal:         General: Normal range of motion. Cervical back: Normal range of motion. Neurological:      Mental Status: She is alert and oriented to person, place, and time. Skin:     General: Skin is warm and dry. Psychiatric:         Behavior: Behavior normal.   Vitals and nursing note reviewed. Vital Signs  Blood pressure 90/64, height 5' 8\" (1.727 m), weight 241 lb (109.3 kg), last menstrual period 12/10/2022. Results reviewed today:    USN today  Viable IUP 7 5/7 weeks gestation    Posterior fibroid 1.2cm at greatest  CXL 4.6cm  Right sided CLC 0.8cm at greatest  Left ovary normal                              Assessment and Plan          Diagnosis Orders   1. Amenorrhea, secondary          Discussed timing of PNI/Nob  Discussed to continue progesterone through end of 12 weeks gest  Discussed early GTT        I have discontinued Harjinder Cavanaugh's ketorolac, letrozole, and Chorionic Gonadotropin. I am also having her maintain her Prenatal Vit-Fe Fumarate-FA (PRENATAL VITAMIN PO), metFORMIN, Probiotic Product (PROBIOTIC-10 PO), and progesterone. Return in about 2 weeks (around 2/14/2023) for PNI. She was also counseled on her preventative health maintenance recommendations and follow-up. There are no Patient Instructions on file for this visit.     [address] DONATO Jessica CNM,1/31/2023 10:59 AM                     Electronically signed by DONATO Betts CNM

## 2023-02-01 ENCOUNTER — TELEPHONE (OUTPATIENT)
Dept: OBGYN CLINIC | Age: 23
End: 2023-02-01

## 2023-02-01 NOTE — TELEPHONE ENCOUNTER
Pt left VM stating she was in yesterday for USN and as day went on started to develop body aches, chest pain, and feel sick. Called pt back and she stated she just got back for seeing PCP and they swabbed her for the flu. She states that they were dismissive for her chest pain. Pt states when she coughs really hard that her chest hurts. Per Freya Cruz pt should go to ER for further evaluation. Pt was advised of this and voiced understanding.

## 2023-02-13 ENCOUNTER — HOSPITAL ENCOUNTER (OUTPATIENT)
Age: 23
Setting detail: SPECIMEN
Discharge: HOME | End: 2023-02-13

## 2023-02-13 ENCOUNTER — INITIAL PRENATAL (OUTPATIENT)
Dept: OBGYN CLINIC | Age: 23
End: 2023-02-13

## 2023-02-13 VITALS
DIASTOLIC BLOOD PRESSURE: 76 MMHG | BODY MASS INDEX: 35.58 KG/M2 | HEIGHT: 68 IN | SYSTOLIC BLOOD PRESSURE: 122 MMHG | WEIGHT: 234.8 LBS

## 2023-02-13 DIAGNOSIS — Z34.01 ENCOUNTER FOR SUPERVISION OF NORMAL FIRST PREGNANCY IN FIRST TRIMESTER: ICD-10-CM

## 2023-02-13 DIAGNOSIS — O99.210 OBESITY IN PREGNANCY: ICD-10-CM

## 2023-02-13 DIAGNOSIS — Z34.01 ENCOUNTER FOR SUPERVISION OF NORMAL FIRST PREGNANCY IN FIRST TRIMESTER: Primary | ICD-10-CM

## 2023-02-13 LAB
ABO/RH: NORMAL
ABSOLUTE EOS #: 0.21 K/UL (ref 0–0.44)
ABSOLUTE IMMATURE GRANULOCYTE: 0.05 K/UL (ref 0–0.3)
ABSOLUTE LYMPH #: 2.48 K/UL (ref 1.1–3.7)
ABSOLUTE MONO #: 0.55 K/UL (ref 0.1–1.2)
AMPHETAMINE SCREEN URINE: NEGATIVE
ANTIBODY SCREEN: NEGATIVE
BARBITURATE SCREEN URINE: NEGATIVE
BASOPHILS # BLD: 0 % (ref 0–2)
BASOPHILS ABSOLUTE: <0.03 K/UL (ref 0–0.2)
BENZODIAZEPINE SCREEN, URINE: NEGATIVE
BILIRUBIN URINE: NEGATIVE
CANNABINOID SCREEN URINE: NEGATIVE
COCAINE METABOLITE, URINE: NEGATIVE
COLOR: YELLOW
COMMENT UA: ABNORMAL
EOSINOPHILS RELATIVE PERCENT: 2 % (ref 1–4)
FENTANYL URINE: NEGATIVE
GLUCOSE ADMINISTRATION: NORMAL
GLUCOSE TOLERANCE SCREEN 50G: 117 MG/DL (ref 70–135)
GLUCOSE UR STRIP.AUTO-MCNC: NEGATIVE MG/DL
HBV SURFACE AG SER QL: NONREACTIVE
HCT VFR BLD AUTO: 38.8 % (ref 36.3–47.1)
HCV AB SER QL: NONREACTIVE
HGB BLD-MCNC: 13.2 G/DL (ref 11.9–15.1)
HIV 1+2 AB+HIV1 P24 AG SERPL QL IA: NONREACTIVE
IMMATURE GRANULOCYTES: 1 %
KETONES UR STRIP.AUTO-MCNC: ABNORMAL MG/DL
LEUKOCYTE ESTERASE UR QL STRIP.AUTO: NEGATIVE
LYMPHOCYTES # BLD: 22 % (ref 24–43)
MCH RBC QN AUTO: 30.6 PG (ref 25.2–33.5)
MCHC RBC AUTO-ENTMCNC: 34 G/DL (ref 28.4–34.8)
MCV RBC AUTO: 89.8 FL (ref 82.6–102.9)
METHADONE SCREEN, URINE: NEGATIVE
MONOCYTES # BLD: 5 % (ref 3–12)
NITRITE UR QL STRIP.AUTO: NEGATIVE
NRBC AUTOMATED: 0 PER 100 WBC
OPIATES, URINE: NEGATIVE
OXYCODONE SCREEN URINE: NEGATIVE
PDW BLD-RTO: 12.3 % (ref 11.8–14.4)
PHENCYCLIDINE, URINE: NEGATIVE
PLATELET # BLD AUTO: 296 K/UL (ref 138–453)
PMV BLD AUTO: 10.7 FL (ref 8.1–13.5)
PROT UR STRIP.AUTO-MCNC: 6.5 MG/DL (ref 5–8)
PROT UR STRIP.AUTO-MCNC: ABNORMAL MG/DL
RBC # BLD: 4.32 M/UL (ref 3.95–5.11)
RUBV IGG SER QL: 10.1 IU/ML
SEG NEUTROPHILS: 70 % (ref 36–65)
SEGMENTED NEUTROPHILS ABSOLUTE COUNT: 7.75 K/UL (ref 1.5–8.1)
SPECIFIC GRAVITY UA: 1.03 (ref 1–1.03)
T PALLIDUM AB SER QL IA: NONREACTIVE
TEST INFORMATION: NORMAL
TURBIDITY: ABNORMAL
URINE HGB: NEGATIVE
UROBILINOGEN, URINE: NORMAL
WBC # BLD AUTO: 11.1 K/UL (ref 3.5–11.3)

## 2023-02-13 PROCEDURE — 0500F INITIAL PRENATAL CARE VISIT: CPT | Performed by: ADVANCED PRACTICE MIDWIFE

## 2023-02-13 NOTE — PROGRESS NOTES
Here for PNI, has had nausea at times. Denies hx of MRSA. Declines genetic testing. Early 1 hr gtt done today with  d/t BMI. Has NOB appt 2/27. Questions answered and forms signed.

## 2023-02-14 LAB
CHLAMYDIA DNA UR QL NAA+PROBE: NEGATIVE
MICROORGANISM SPEC CULT: NORMAL
N GONORRHOEA DNA UR QL NAA+PROBE: NEGATIVE
SPECIMEN DESCRIPTION: NORMAL
SPECIMEN DESCRIPTION: NORMAL

## 2023-02-27 ENCOUNTER — INITIAL PRENATAL (OUTPATIENT)
Dept: OBGYN CLINIC | Age: 23
End: 2023-02-27
Payer: COMMERCIAL

## 2023-02-27 VITALS — WEIGHT: 233.8 LBS | BODY MASS INDEX: 35.55 KG/M2 | SYSTOLIC BLOOD PRESSURE: 110 MMHG | DIASTOLIC BLOOD PRESSURE: 70 MMHG

## 2023-02-27 DIAGNOSIS — Z34.01 ENCOUNTER FOR SUPERVISION OF NORMAL FIRST PREGNANCY IN FIRST TRIMESTER: Primary | ICD-10-CM

## 2023-02-27 DIAGNOSIS — Z87.42 HISTORY OF INFERTILITY: ICD-10-CM

## 2023-02-27 PROCEDURE — 0501F PRENATAL FLOW SHEET: CPT | Performed by: ADVANCED PRACTICE MIDWIFE

## 2023-02-27 NOTE — PROGRESS NOTES
Arcadio Jeffers is a 23 y.o. female 11w2d      The patient was seen and evaluated. Fetal movement was Absent . No contractions or leakage of fluid. Signs and symptoms of  labor as well as labor were reviewed. Genetic testing was not ordered and reviewed. MSAFP was not ordered for a 15-20 week gestational age window.  Reviewed. Dates were reviewed with the patient. An 18-22 week anatomy ultrasound has been ordered. The patient will return to the office for her next visit in 4 weeks. See antepartum flow sheet. Assessment:  1. Arcadio Jeffers is a 23 y.o. female  2.   3. 11w2d    There are no problems to display for this patient. Diagnosis Orders   1. Encounter for supervision of normal first pregnancy in first trimester        2. History of infertility              Plan:  Return in about 4 weeks (around 3/27/2023) for OB Follow Up. There are no Patient Instructions on file for this visit.

## 2023-03-14 ENCOUNTER — NURSE TRIAGE (OUTPATIENT)
Dept: OTHER | Age: 23
End: 2023-03-14

## 2023-03-14 NOTE — TELEPHONE ENCOUNTER
Pt is 13.5 weeks pregnant. Coughing and nasal drainage x 3 days. Asking what medication she can take for cough. Denies fever and body ache. Admits to chest discomfort from coughing. Pt ask if she can take Robitussin for cough. Pt informed of the four OTC medications she can take. Tylenol , Robitussin, Benadryl, and Claritin. Request she call office if symptoms do not improve with medication. Pt agrees. Dwb/rn      Reason for Disposition   Health Information question, no triage required and triager able to answer question    Answer Assessment - Initial Assessment Questions  1. REASON FOR CALL or QUESTION: \"What is your reason for calling today? \" or \"How can I best help you? \" or \"What question do you have that I can help answer? \"      Medications allow when pregnant for cough. Pt is 13.5 weeks    Protocols used:  Information Only Call - No Triage[facility]

## 2023-03-25 SDOH — ECONOMIC STABILITY: FOOD INSECURITY: WITHIN THE PAST 12 MONTHS, YOU WORRIED THAT YOUR FOOD WOULD RUN OUT BEFORE YOU GOT MONEY TO BUY MORE.: NEVER TRUE

## 2023-03-25 SDOH — ECONOMIC STABILITY: HOUSING INSECURITY
IN THE LAST 12 MONTHS, WAS THERE A TIME WHEN YOU DID NOT HAVE A STEADY PLACE TO SLEEP OR SLEPT IN A SHELTER (INCLUDING NOW)?: NO

## 2023-03-25 SDOH — ECONOMIC STABILITY: INCOME INSECURITY: HOW HARD IS IT FOR YOU TO PAY FOR THE VERY BASICS LIKE FOOD, HOUSING, MEDICAL CARE, AND HEATING?: NOT VERY HARD

## 2023-03-25 SDOH — ECONOMIC STABILITY: TRANSPORTATION INSECURITY
IN THE PAST 12 MONTHS, HAS LACK OF TRANSPORTATION KEPT YOU FROM MEETINGS, WORK, OR FROM GETTING THINGS NEEDED FOR DAILY LIVING?: NO

## 2023-03-25 SDOH — ECONOMIC STABILITY: FOOD INSECURITY: WITHIN THE PAST 12 MONTHS, THE FOOD YOU BOUGHT JUST DIDN'T LAST AND YOU DIDN'T HAVE MONEY TO GET MORE.: NEVER TRUE

## 2023-03-27 ENCOUNTER — ROUTINE PRENATAL (OUTPATIENT)
Dept: OBGYN CLINIC | Age: 23
End: 2023-03-27

## 2023-03-27 VITALS — SYSTOLIC BLOOD PRESSURE: 120 MMHG | BODY MASS INDEX: 35.09 KG/M2 | DIASTOLIC BLOOD PRESSURE: 80 MMHG | WEIGHT: 230.8 LBS

## 2023-03-27 DIAGNOSIS — Z34.02 ENCOUNTER FOR SUPERVISION OF NORMAL FIRST PREGNANCY IN SECOND TRIMESTER: Primary | ICD-10-CM

## 2023-03-27 PROCEDURE — 0502F SUBSEQUENT PRENATAL CARE: CPT | Performed by: ADVANCED PRACTICE MIDWIFE

## 2023-03-27 NOTE — PROGRESS NOTES
Mary Rasheed is a 23 y.o. female 15w2d      The patient was seen and evaluated. Fetal movement was Absent . No contractions or leakage of fluid. Signs and symptoms of  labor as well as labor were reviewed. Genetic testing was not ordered and reviewed. MSAFP was not ordered for a 15-20 week gestational age window.  Reviewed. Dates were reviewed with the patient. An 18-22 week anatomy ultrasound has been ordered. The patient will return to the office for her next visit in 4 weeks. See antepartum flow sheet. Admits to increased anxiety with pregnancy - fearful something will go wrong, that she will pass away, just \"really anxious\" and admits for no real reason. Discussed coping measures    Has bilateral pain - described as round ligament - with laying down or cough/sneeze - discussed normal      Assessment:  1. Mary Rasheed is a 23 y.o. female  2.   3. 15w2d    There are no problems to display for this patient. Diagnosis Orders   1. Encounter for supervision of normal first pregnancy in second trimester              Plan:  No follow-ups on file. There are no Patient Instructions on file for this visit.

## 2023-04-24 ENCOUNTER — ROUTINE PRENATAL (OUTPATIENT)
Dept: OBGYN CLINIC | Age: 23
End: 2023-04-24

## 2023-04-24 VITALS — BODY MASS INDEX: 35.34 KG/M2 | DIASTOLIC BLOOD PRESSURE: 74 MMHG | WEIGHT: 232.4 LBS | SYSTOLIC BLOOD PRESSURE: 114 MMHG

## 2023-04-24 DIAGNOSIS — Z34.02 ENCOUNTER FOR SUPERVISION OF NORMAL FIRST PREGNANCY IN SECOND TRIMESTER: Primary | ICD-10-CM

## 2023-04-24 PROCEDURE — 0502F SUBSEQUENT PRENATAL CARE: CPT | Performed by: ADVANCED PRACTICE MIDWIFE

## 2023-04-24 NOTE — PROGRESS NOTES
Nate Landis is a 23 y.o. female 19w2d    The patient was seen and evaluated. There was positive fetal movements. No contractions or leakage of fluid. Signs and symptoms of  labor as well as labor were reviewed. The patients anatomy ultrasound has been completed and reviewed with patient. A 28 week lab panel was ordered. This includes a (HH, 1 hr GTT). The patient is to complete this in the next two to four weeks. The S/S of Pre-Eclampsia were reviewed with the patient in detail. She is to report any of these if they occur. She currently denies any of these. The patient is RH positive Rhogam Ordered no    The patient was instructed on fetal kick counts and was encouraged to monitor every 8 hours. This is to begin at 28 weeks gestation. She was instructed that the baby should move at a minimum of ten times within one hour after a meal. The patient was instructed to lay down on her left side twenty minutes after eating and count movements for up to one hour with a target value of ten movements. She was instructed to notify the office if she did not make that target after two attempts or if after any attempt there was less than four movements. Assessment:  1. Nate Landis is a 23 y.o. female  2.   3. 19w2d    There are no problems to display for this patient. Diagnosis Orders   1. Encounter for supervision of normal first pregnancy in second trimester              Plan:  The patient will return to the office for her next visit in 4 weeks. See antepartum flow sheet.

## 2023-05-25 ENCOUNTER — ROUTINE PRENATAL (OUTPATIENT)
Dept: OBGYN CLINIC | Age: 23
End: 2023-05-25

## 2023-05-25 VITALS — WEIGHT: 236 LBS | SYSTOLIC BLOOD PRESSURE: 110 MMHG | DIASTOLIC BLOOD PRESSURE: 70 MMHG | BODY MASS INDEX: 35.88 KG/M2

## 2023-05-25 DIAGNOSIS — Z34.02 ENCOUNTER FOR SUPERVISION OF NORMAL FIRST PREGNANCY IN SECOND TRIMESTER: Primary | ICD-10-CM

## 2023-05-25 NOTE — PROGRESS NOTES
Brigid Ramirez is a 23 y.o. female 23w5d    The patient was seen and evaluated. There was positive fetal movements. No contractions or leakage of fluid. Signs and symptoms of  labor as well as labor were reviewed. The patients anatomy ultrasound has been completed and reviewed with patient. A 28 week lab panel was ordered. This includes a (HH, 1 hr GTT, 3 hr GTT). The patient is to complete this in the next two to four weeks. Experienced intense vomiting after eating bad food about 2.5 weeks ago. Feeling well now. The S/S of Pre-Eclampsia were reviewed with the patient in detail. She is to report any of these if they occur. She currently denies any of these. The patient is RH positive Rhogam Ordered no    The patient was instructed on fetal kick counts and was encouraged to monitor every 8 hours. This is to begin at 28 weeks gestation. She was instructed that the baby should move at a minimum of ten times within one hour after a meal. The patient was instructed to lay down on her left side twenty minutes after eating and count movements for up to one hour with a target value of ten movements. She was instructed to notify the office if she did not make that target after two attempts or if after any attempt there was less than four movements. Assessment:  1. Brigid Ramirez is a 23 y.o. female  2.   3. 23w5d    There are no problems to display for this patient. Diagnosis Orders   1. Encounter for supervision of normal first pregnancy in second trimester  CBC with Auto Differential    Glucose tolerance, 1 hour            Plan:  The patient will return to the office for her next visit in 4 weeks. See antepartum flow sheet.

## 2023-06-01 ENCOUNTER — HOSPITAL ENCOUNTER (OUTPATIENT)
Age: 23
Discharge: HOME | End: 2023-06-01
Attending: ADVANCED PRACTICE MIDWIFE | Admitting: ADVANCED PRACTICE MIDWIFE
Payer: COMMERCIAL

## 2023-06-01 ENCOUNTER — NURSE TRIAGE (OUTPATIENT)
Dept: OTHER | Age: 23
End: 2023-06-01

## 2023-06-01 VITALS
SYSTOLIC BLOOD PRESSURE: 116 MMHG | TEMPERATURE: 98.6 F | DIASTOLIC BLOOD PRESSURE: 62 MMHG | HEART RATE: 89 BPM | RESPIRATION RATE: 16 BRPM

## 2023-06-01 PROBLEM — R10.9 ABDOMINAL PAIN: Status: ACTIVE | Noted: 2023-06-01

## 2023-06-01 LAB
BILIRUB UR QL STRIP: NEGATIVE
CLARITY UR: CLEAR
COLOR UR: YELLOW
GLUCOSE UR STRIP-MCNC: NEGATIVE MG/DL
HGB UR QL STRIP.AUTO: NEGATIVE
KETONES UR STRIP-MCNC: NEGATIVE MG/DL
LEUKOCYTE ESTERASE UR QL STRIP: NEGATIVE
NITRITE UR QL STRIP: NEGATIVE
PH UR STRIP: 6 [PH] (ref 5–9)
PROT UR STRIP-MCNC: NEGATIVE MG/DL
SP GR UR STRIP: 1.01 (ref 1.01–1.02)
UROBILINOGEN UR STRIP-ACNC: NORMAL

## 2023-06-01 PROCEDURE — 99211 OFF/OP EST MAY X REQ PHY/QHP: CPT

## 2023-06-01 PROCEDURE — 81003 URINALYSIS AUTO W/O SCOPE: CPT

## 2023-06-01 NOTE — TELEPHONE ENCOUNTER
Reason for Disposition   Health Information question, no triage required and triager able to answer question    Answer Assessment - Initial Assessment Questions  1. REASON FOR CALL or QUESTION: \"What is your reason for calling today? \" or \"How can I best help you? \" or \"What question do you have that I can help answer? \"      Tova Anglin is complaining of lower abdominal pain and cramping for 10-15 minutes. Denies vaginal bleeding or discharge. Protocols used: Information Only Call - No Triage-[facility]  will take her to [place] [facility] now.

## 2023-06-02 NOTE — FLOWSHEET NOTE
Patient to room 204 accompanied by  for concerns of abdominal cramping and left upper back and left flank that started a few hours ago. Says that abdominal pain has resolved but still having intermittent back and flank pain. Notes active fetal movement and denies vaginal bleeding or drainage. Oriented to room and call system and she verbalized understanding.

## 2023-06-02 NOTE — FLOWSHEET NOTE
Discharge instructions discussed with patient and  and copy given with both verbalizing understanding. Patient voices no concerns and ambulates out of unit accompanied by her .

## 2023-06-02 NOTE — DISCHARGE INSTRUCTIONS
Linda Pringle [address] [phone]   or Araselichrissie Angulo [phone]     Dr Felicity Ratliff [id number]  (31 Ruchrissie RodriguezMarielena, MSN, APRN, [address] [facility]  [address]  [phone]     Dr. Gala Cardozo Yonatan  [address] [facility]   [phone]    [address] [facility] [address]  [phone]    [facility] [address] [employer] [place], [address] [facility] [place]  [phone]      ACTIVITY LIMITATIONS:  ( X )Up and about as desired and tolerated  (  )Up to bathroom only  (  )Dequan Jacque on either side  (  )Avoid heavy lifting or exercise  (  )No sex  (  )No nipple stimulation  (  )Complet bedrest  (  )Avoid using stairs  ( X )Increase fluids  **DRINK AT LEAST eight-8oz. Glasses of water daily. **    Call your Doctor if:  (  )Contractions are every 5 minutes apart (from start of one to the start of the next contraction) lasting 60 seconds for at least 1 hour, strong enough you can not walk or talk through the contraction and regular. ( X )Bag of water breaks  ( X )Vaginal bleeding  (  X)Unusual pain occurs  (X  )Decreased fetal movement  (  X) labor:  If you have 4 contractions in an hour    Keep your scheduled follow up appointment. Or call for a follow up on________________________________________________. IN CASE OF EMERGENCY CONTACT LABOR AND DELIVERY [phone]. DISCUSS CONCERNS OF LEFT BACK AND FLANK PAIN AFTER EATING SPICY FOODS WITH DR. AYALA AT NEXT APPOINTMENT AND IF A GALLBLADDER ULTRASOUND IS NEEDED.

## 2023-06-22 ENCOUNTER — ROUTINE PRENATAL (OUTPATIENT)
Dept: OBGYN CLINIC | Age: 23
End: 2023-06-22

## 2023-06-22 ENCOUNTER — HOSPITAL ENCOUNTER (OUTPATIENT)
Age: 23
Setting detail: SPECIMEN
Discharge: HOME | End: 2023-06-22

## 2023-06-22 VITALS — DIASTOLIC BLOOD PRESSURE: 70 MMHG | SYSTOLIC BLOOD PRESSURE: 102 MMHG | BODY MASS INDEX: 36.52 KG/M2 | WEIGHT: 240.2 LBS

## 2023-06-22 DIAGNOSIS — Z34.02 ENCOUNTER FOR SUPERVISION OF NORMAL FIRST PREGNANCY IN SECOND TRIMESTER: ICD-10-CM

## 2023-06-22 DIAGNOSIS — Z34.02 ENCOUNTER FOR SUPERVISION OF NORMAL FIRST PREGNANCY IN SECOND TRIMESTER: Primary | ICD-10-CM

## 2023-06-22 LAB
BASOPHILS # BLD: <0.03 K/UL (ref 0–0.2)
BASOPHILS NFR BLD: 0 % (ref 0–2)
EOSINOPHIL # BLD: 0.19 K/UL (ref 0–0.44)
EOSINOPHILS RELATIVE PERCENT: 2 % (ref 1–4)
ERYTHROCYTE [DISTWIDTH] IN BLOOD BY AUTOMATED COUNT: 12.6 % (ref 11.8–14.4)
GLUCOSE 1H P 50 G GLC PO SERPL-MCNC: 125 MG/DL (ref 70–135)
GLUCOSE ADMINISTRATION: NORMAL
HCT VFR BLD AUTO: 37.2 % (ref 36.3–47.1)
HGB BLD-MCNC: 12.3 G/DL (ref 11.9–15.1)
IMM GRANULOCYTES # BLD AUTO: 0.06 K/UL (ref 0–0.3)
IMM GRANULOCYTES NFR BLD: 1 %
LYMPHOCYTES # BLD: 16 % (ref 24–43)
LYMPHOCYTES NFR BLD: 1.74 K/UL (ref 1.1–3.7)
MCH RBC QN AUTO: 31.1 PG (ref 25.2–33.5)
MCHC RBC AUTO-ENTMCNC: 33.1 G/DL (ref 28.4–34.8)
MCV RBC AUTO: 93.9 FL (ref 82.6–102.9)
MONOCYTES NFR BLD: 0.54 K/UL (ref 0.1–1.2)
MONOCYTES NFR BLD: 5 % (ref 3–12)
NEUTROPHILS NFR BLD: 76 % (ref 36–65)
NEUTS SEG NFR BLD: 8.35 K/UL (ref 1.5–8.1)
NRBC AUTOMATED: 0 PER 100 WBC
PLATELET # BLD AUTO: 209 K/UL (ref 138–453)
PMV BLD AUTO: 11.9 FL (ref 8.1–13.5)
RBC # BLD AUTO: 3.96 M/UL (ref 3.95–5.11)
WBC OTHER # BLD: 10.9 K/UL (ref 3.5–11.3)

## 2023-06-22 NOTE — PROGRESS NOTES
Vikki Hampton is a 23 y.o. female 27w5d    The patient was seen and evaluated. There was positive fetal movements. No contractions or leakage of fluid. Signs and symptoms of  labor as well as labor were reviewed. The S/S of Pre-Eclampsia were reviewed with the patient in detail. She is to report any of these if they occur. She currently denies any of these. The patient had her 28 week labs in process. Admission on 2023, Discharged on 2023   Component Date Value Ref Range Status    Color, UA 2023 Yellow  Yellow Final    Turbidity UA 2023 Clear  Clear Final    Glucose, Ur 2023 NEGATIVE  NEGATIVE Final    Bilirubin Urine 2023 NEGATIVE  NEGATIVE Final    Ketones, Urine 2023 NEGATIVE  NEGATIVE Final    Specific [place], UA 2023 1.015  1.010 - 1.020 Final    Urine Hgb 2023 NEGATIVE  NEGATIVE Final    pH, UA 2023 6.0  5.0 - 9.0 Final    Protein, UA 2023 NEGATIVE  NEGATIVE Final    Urobilinogen, Urine 2023 Normal  Normal Final    Nitrite, Urine 2023 NEGATIVE  NEGATIVE Final    Leukocyte Esterase, Urine 2023 NEGATIVE  NEGATIVE Final       The patient was instructed on fetal kick counts and is encouraged to monitor every 8 hours. She was instructed that the baby should move at a minimum of ten times within one hour after a meal. The patient was instructed to lay down on her left side twenty minutes after eating and count movements for up to one hour with a target value of ten movements. She was instructed to notify the office if she did not make that target after two attempts or if after any attempt there was less than four movements. The patient reports that the fetal movement targets have been made Yes.  Testing:  Growth next visit    Assessment:  1. Vikki Hampton is a 23 y.o. female  2.    3. 27w5d    Patient Active Problem List    Diagnosis Date Noted    Abdominal pain 2023

## 2023-07-05 ENCOUNTER — ROUTINE PRENATAL (OUTPATIENT)
Dept: OBGYN CLINIC | Age: 23
End: 2023-07-05

## 2023-07-05 VITALS — WEIGHT: 240 LBS | DIASTOLIC BLOOD PRESSURE: 60 MMHG | SYSTOLIC BLOOD PRESSURE: 100 MMHG | BODY MASS INDEX: 36.49 KG/M2

## 2023-07-05 DIAGNOSIS — Z3A.29 29 WEEKS GESTATION OF PREGNANCY: Primary | ICD-10-CM

## 2023-07-05 PROCEDURE — 0502F SUBSEQUENT PRENATAL CARE: CPT | Performed by: OBSTETRICS & GYNECOLOGY

## 2023-07-14 ENCOUNTER — HOSPITAL ENCOUNTER (OUTPATIENT)
Age: 23
Discharge: HOME | End: 2023-07-14
Attending: OBSTETRICS & GYNECOLOGY | Admitting: OBSTETRICS & GYNECOLOGY
Payer: COMMERCIAL

## 2023-07-14 ENCOUNTER — NURSE TRIAGE (OUTPATIENT)
Dept: OTHER | Age: 23
End: 2023-07-14

## 2023-07-14 VITALS
RESPIRATION RATE: 18 BRPM | SYSTOLIC BLOOD PRESSURE: 120 MMHG | TEMPERATURE: 97.7 F | HEART RATE: 88 BPM | DIASTOLIC BLOOD PRESSURE: 71 MMHG

## 2023-07-14 PROCEDURE — 59025 FETAL NON-STRESS TEST: CPT

## 2023-07-14 PROCEDURE — 99215 OFFICE O/P EST HI 40 MIN: CPT

## 2023-07-14 NOTE — DISCHARGE INSTRUCTIONS
[address] [facility] [phone]   or Tigist Derrek [phone]     Dr Tramaine Paiz  [address] [facility]  [address]  [phone]        ACTIVITY LIMITATIONS:  ( x )Up and about as desired and tolerated  (  )Up to bathroom only  ( x )Harden Rachell on either side  (  x)Avoid heavy lifting or exercise  (  )No sex  (  )No nipple stimulation  (  )Complet bedrest  (  )Avoid using stairs  ( x )Increase fluids  **DRINK AT LEAST eight-8oz. Glasses of water daily. **    Call your Doctor if:  (  )Contractions are every 5 minutes apart (from start of one to the start of the next contraction) lasting 60 seconds for at least 1 hour, strong enough you can not walk or talk through the contraction and regular. (x  )Bag of water breaks  (  x)Vaginal bleeding  (x  )Unusual pain occurs  (  x)Decreased fetal movement  (  x) labor:  If you have 4 contractions in an hour    Or call for a follow up on 23 to be seen on  or 23. IN CASE OF EMERGENCY CONTACT LABOR AND DELIVERY [phone].

## 2023-07-14 NOTE — TELEPHONE ENCOUNTER
7.14.2023 at 12:47 pm patient called after hours with concern she is 33 wks. Pregnant and noting decreased fetal movement. Noted faint movement over past 1 1/2 hours and has eaten a full meal, drank ice cold water attempted to stimulate baby movement but no response. Patient advised to go to [facility] labor and delivery for further evaluaton per Modified Triage protocol and patient agreeable.   NILE MORAN.

## 2023-07-14 NOTE — PROGRESS NOTES
Dr. Sanket Stein present in [facility] department. Rn reports pt c/o decreased fetal movement, pt came in from work and last time she felt movement was prior to lunch at 11:45a.m.

## 2023-07-14 NOTE — PROGRESS NOTES
Patient off unit in stable condition. Departure Mode: with significant other. and mother.     Mobility at Departure: ambulatory    Discharged to: private residence    Time of Discharge: 14:43

## 2023-07-14 NOTE — PROGRESS NOTES
Patient presents to L&D today for decreased fetal movement    IUP at 30 6/7  weeks     NST is reactive. Quality of tracing is satisfactory.

## 2023-07-14 NOTE — PROGRESS NOTES
OB outpatient discharge instructions explained to pt, pt v.u. How to do a fetal kick count explained, pt v.u.

## 2023-07-14 NOTE — PROGRESS NOTES
Pt comes in with complaints of decreased fetal movement. Pt ate lunch at 11:45am today and last felt one kick prior to eating lunch. Pt denies vaginal bleeding or leaking of fluid. Pt states she is concerned because baby is \"usually super active\" and she has only felt the one kick. Pt states she tried to lay on her left side and then right side and drank a bottle of cold water and then did some \"laps around the office. \" Pt denies problems, states she did come in for evaluation once when she was 25 wks gestation for left sided pain. Pt states she did just go to the chiropractor on Monday and it was the first time in last 10 weeks.

## 2023-07-24 ENCOUNTER — ROUTINE PRENATAL (OUTPATIENT)
Dept: OBGYN CLINIC | Age: 23
End: 2023-07-24

## 2023-07-24 VITALS — BODY MASS INDEX: 36.74 KG/M2 | DIASTOLIC BLOOD PRESSURE: 62 MMHG | WEIGHT: 241.6 LBS | SYSTOLIC BLOOD PRESSURE: 120 MMHG

## 2023-07-24 DIAGNOSIS — Z34.03 ENCOUNTER FOR SUPERVISION OF NORMAL FIRST PREGNANCY IN THIRD TRIMESTER: Primary | ICD-10-CM

## 2023-07-24 PROCEDURE — 0502F SUBSEQUENT PRENATAL CARE: CPT | Performed by: ADVANCED PRACTICE MIDWIFE

## 2023-07-24 NOTE — PROGRESS NOTES
Austen Martinez is a 23 y.o. female 32w2d    The patient was seen and evaluated. There was positive fetal movements. No contractions or leakage of fluid. Signs and symptoms of  labor as well as labor were reviewed. The S/S of Pre-Eclampsia were reviewed with the patient in detail. She is to report any of these if they occur. She currently denies any of these. Reports with long standing episodes her legs get tingly - alternates. Denies any swelling/pain. Recommended compression stockings    Reports fingers are itchy - mainly on tops and sides and intermittently has little bumps on them. Swelling of hands also present. Discussed take allergy pill daily, can use topical creams or PO Benadryl and call for changes - no itching to abd or soles of feet. Should those develop then labs for cholestasis. The patient had her 28 week labs completed.   Hospital Outpatient Visit on 2023   Component Date Value Ref Range Status    GLU ADMN 2023 Glucola   Final    Glucose tolerance screen 50g 2023 125  70 - 135 mg/dL Final    WBC 2023 10.9  3.5 - 11.3 k/uL Final    RBC 2023 3.96  3.95 - 5.11 m/uL Final    Hemoglobin 2023 12.3  11.9 - 15.1 g/dL Final    Hematocrit 2023 37.2  36.3 - 47.1 % Final    MCV 2023 93.9  82.6 - 102.9 fL Final    MCH 2023 31.1  25.2 - 33.5 pg Final    MCHC 2023 33.1  28.4 - 34.8 g/dL Final    RDW 2023 12.6  11.8 - 14.4 % Final    Platelets  209  138 - 453 k/uL Final    MPV 2023 11.9  8.1 - 13.5 fL Final    NRBC Automated 2023 0.0  0.0 per 100 WBC Final    Seg Neutrophils 2023 76 (H)  36 - 65 % Final    Lymphocytes 2023 16 (L)  24 - 43 % Final    Monocytes 2023 5  3 - 12 % Final    Eosinophils % 2023 2  1 - 4 % Final    Basophils 2023 0  0 - 2 % Final    Immature Granulocytes 2023 1 (H)  0 % Final    Segs Absolute 2023 8.35 (H)  1.50 - 8.10 k/uL Final

## 2023-07-30 ENCOUNTER — HOSPITAL ENCOUNTER (OUTPATIENT)
Age: 23
Discharge: HOME | End: 2023-07-30
Attending: MIDWIFE | Admitting: MIDWIFE
Payer: COMMERCIAL

## 2023-07-30 ENCOUNTER — NURSE TRIAGE (OUTPATIENT)
Dept: OTHER | Age: 23
End: 2023-07-30

## 2023-07-30 VITALS
WEIGHT: 241 LBS | SYSTOLIC BLOOD PRESSURE: 131 MMHG | HEART RATE: 93 BPM | RESPIRATION RATE: 18 BRPM | BODY MASS INDEX: 36.53 KG/M2 | DIASTOLIC BLOOD PRESSURE: 72 MMHG | TEMPERATURE: 97.9 F | HEIGHT: 68 IN

## 2023-07-30 PROBLEM — R10.9 ABDOMINAL CRAMPING: Status: ACTIVE | Noted: 2023-07-30

## 2023-07-30 LAB
% FETAL BLEED: NORMAL %
FETAL BLEED VOLUME: NORMAL ML
RHOGAM DOSES REQUIRED: NORMAL

## 2023-07-30 PROCEDURE — 36415 COLL VENOUS BLD VENIPUNCTURE: CPT

## 2023-07-30 PROCEDURE — 99214 OFFICE O/P EST MOD 30 MIN: CPT

## 2023-07-30 PROCEDURE — 59025 FETAL NON-STRESS TEST: CPT

## 2023-07-30 PROCEDURE — 85460 HEMOGLOBIN FETAL: CPT

## 2023-07-30 ASSESSMENT — PAIN DESCRIPTION - DESCRIPTORS: DESCRIPTORS: SORE

## 2023-07-30 NOTE — FLOWSHEET NOTE
Patient states just pulling type soreness on right side of abdomen only when standing, denies any cramping, states feels occasional tightening of abdomen, no pain, has been feeling fetal movement, no leaking of fluid or bleeding

## 2023-07-30 NOTE — FLOWSHEET NOTE
Talked with Brenda Leonard CNM informed of patient stating pulling like soreness on right side of abdomen that she had since she has been here only feels when standing,  feels like round ligament, only rare contraction, patient denies any cramping or pain, pulse when patient into OB department, 120 per monitor, radial 110 and has been in the 90's since then, no new orders received

## 2023-07-30 NOTE — DISCHARGE INSTRUCTIONS
Luan Mcallister Later  Dr. Lorrie Shine  [address] [facility] [phone]   or Sherita Aleman [phone]     Dr Lorrie Ervin [id number]  ([address] [facility], MSN, APRN, [address] [place] [address]  [address]  [phone]     Dr. Maia Dietrich   [address]   [phone]    [address] [place] [address]. [address] [facility], [address]  [phone]    [address], [address] [place]  [phone]      ACTIVITY LIMITATIONS:  (  )Up and about as desired and tolerated  (  )Up to bathroom only  (  )Springer Leash on either side  (  )Avoid heavy lifting or exercise  (  )No sex  (  )No nipple stimulation  (  )Complet bedrest  (  )Avoid using stairs  (  )Increase fluids  **DRINK AT LEAST eight-8oz. Glasses of water daily. **    Call your Doctor if:  (  )Contractions are every 5 minutes apart (from start of one to the start of the next contraction) lasting 60 seconds for at least 1 hour, strong enough you can not walk or talk through the contraction and regular. (  )Bag of water breaks  (  )Vaginal bleeding  (  )Unusual pain occurs  (  )Decreased fetal movement  (  ) labor:  If you have 4 contractions in an hour    Keep your scheduled follow up appointment. Or call for a follow up on________________________________________________. IN CASE OF EMERGENCY CONTACT LABOR AND DELIVERY [phone].

## 2023-07-30 NOTE — FLOWSHEET NOTE
Talked with Merrill Almendarez CNM informed of patient fell at home sliding down 5 steps at home on back and buttocks, abrasions noted on arm and upper back, did not fall on abdomen,  rare contraction seen, patient  initially stated lower abdominal cramping, mild which has resolved,  no bleeding or leaking of fluid, fetal strip reactive,   1 33.1 weeks gestation.   Patient asking to go home after 4 hours of monitoring do to maternity pictures at , orders received

## 2023-07-30 NOTE — FLOWSHEET NOTE
Patient off unit in stable condition. Departure Mode: with significant other.     Mobility at Departure: ambulatory    Discharged to: private residence    Time of Discharge: [phone]

## 2023-07-30 NOTE — FLOWSHEET NOTE
Patient ambulatory to room 203, stating about 20 minutes to 30 minutes ago fell at home, patient states slipped down 5 steps, landed on back and buttocks, did not hit abdomen, patient has approximately 1 2 cm abrasion on right inner upper arm and multiple small abrasions on upper back.   Patient states soreness right lower abdomen, mild cramping initially with fall none now, no leaking of fluid or bleeding, patient initially did not feel much movement, moving  now

## 2023-07-30 NOTE — FLOWSHEET NOTE
This nurse went over preprinted discharge instructions with patient, verbally instructed on watching  for any bleeding, leaking of fluid, pain or tenderness of abdomen, decrease in movement of baby, patient states is aware of fetal kick count and knows how to perform, contractions, patient verbalized

## 2023-07-30 NOTE — FLOWSHEET NOTE
Patient presents to L&D today for fall at home    IUP at 33.1 weeks     NST is reactive. Quality of tracing is satisfactory.

## 2023-07-30 NOTE — FLOWSHEET NOTE
Patient informed of plan of care and hours of fetal monitoring Rayshawn Hays will allow patient to go in 4 hours if fetal strip is reactive, no contractions or pain, no leaking of fluid or bleeding also went over  signs to look for when returning home, patient verbalized understanding

## 2023-08-07 ENCOUNTER — ROUTINE PRENATAL (OUTPATIENT)
Dept: OBGYN CLINIC | Age: 23
End: 2023-08-07

## 2023-08-07 VITALS — DIASTOLIC BLOOD PRESSURE: 60 MMHG | WEIGHT: 244.2 LBS | SYSTOLIC BLOOD PRESSURE: 90 MMHG | BODY MASS INDEX: 37.13 KG/M2

## 2023-08-07 DIAGNOSIS — Z34.03 ENCOUNTER FOR SUPERVISION OF NORMAL FIRST PREGNANCY IN THIRD TRIMESTER: Primary | ICD-10-CM

## 2023-08-07 PROCEDURE — 0502F SUBSEQUENT PRENATAL CARE: CPT | Performed by: ADVANCED PRACTICE MIDWIFE

## 2023-08-08 ENCOUNTER — TELEPHONE (OUTPATIENT)
Dept: OBGYN CLINIC | Age: 23
End: 2023-08-08

## 2023-08-08 NOTE — TELEPHONE ENCOUNTER
Patient called questioning recommendations for drinking Raspberry Left tea starting at 36 weeks. Recommended dose is 1-3 cups/day. Spoke to [employer] and patient may drink one cup daily. Patient aware and verbalized understanding, no further questions/concerns voiced.

## 2023-08-21 ENCOUNTER — HOSPITAL ENCOUNTER (OUTPATIENT)
Age: 23
Setting detail: SPECIMEN
Discharge: HOME | End: 2023-08-21

## 2023-08-21 ENCOUNTER — ROUTINE PRENATAL (OUTPATIENT)
Dept: OBGYN CLINIC | Age: 23
End: 2023-08-21

## 2023-08-21 ENCOUNTER — TELEPHONE (OUTPATIENT)
Dept: OBGYN CLINIC | Age: 23
End: 2023-08-21

## 2023-08-21 VITALS — DIASTOLIC BLOOD PRESSURE: 68 MMHG | WEIGHT: 245.3 LBS | BODY MASS INDEX: 37.3 KG/M2 | SYSTOLIC BLOOD PRESSURE: 104 MMHG

## 2023-08-21 DIAGNOSIS — Z34.03 ENCOUNTER FOR SUPERVISION OF NORMAL FIRST PREGNANCY IN THIRD TRIMESTER: Primary | ICD-10-CM

## 2023-08-21 DIAGNOSIS — Z34.03 ENCOUNTER FOR SUPERVISION OF NORMAL FIRST PREGNANCY IN THIRD TRIMESTER: ICD-10-CM

## 2023-08-21 PROCEDURE — 0502F SUBSEQUENT PRENATAL CARE: CPT | Performed by: NURSE PRACTITIONER

## 2023-08-21 NOTE — PROGRESS NOTES
Melanie Samuel is a 23 y.o. female 36w2d      The patient was seen and evaluated. There was positive fetal movements. No contractions or leakage of fluid. Signs and symptoms of labor were reviewed. The S/S of Pre-Eclampsia were reviewed with the patient in detail. She is to report any of these if they occur. She currently denies any of these. The patient was instructed on fetal kick counts and is encouraged to complete every 8 hours. She was instructed that the baby should move at a minimum of ten times within one hour after a meal. The patient was instructed to lay down on her left side twenty minutes after eating and count movements for up to one hour with a target value of ten movements. She was instructed to notify the office if she did not make that target after two attempts or if after any attempt there was less than four movements. The patient reports that the fetal movement targets have been made Yes. Allergies: Allergies as of 08/21/2023 - Fully Reviewed 08/21/2023   Allergen Reaction Noted    Pcn [penicillins] Hives and Shortness Of Breath 01/28/2021         Group Beta Strep collection was completed. Yes  GBS Results:   No visits with results within 3 Week(s) from this visit. Latest known visit with results is:   Admission on 07/30/2023, Discharged on 07/30/2023   Component Date Value Ref Range Status    Fetal Bleed Volume 07/30/2023 UP TO 15  mL Final    % Fetal Bleed 07/30/2023 NO FETAL CELLS SEEN  % Final    Rhogam Doses Required 07/30/2023 PATIENT IS RH POSITIVE   Final       If not done prior in pregnancy or if had previous positive result in this pregnancy, GC/CT were collected. No.    The patient was counseled on the mandatory call ahead policy. She has been instructed to call the office at anytime prior to going into the hospital so the on-call physician may direct her to the appropriate facility for care.  Exceptions were reviewed including but not limited to: Decreased

## 2023-08-21 NOTE — TELEPHONE ENCOUNTER
Patient left a message on the nurse voicemail stating that she was here earlier for an appointment, but forgot to ask a question. She is questioning if she is ok to start collecting her colostrum? I spoke to [facility] and patient should not be stimulating her breasts at this time. I attempted to call patient and had to leave a message. Reviewed Giselle's recommendations, patient to call with any further questions/concerns.

## 2023-08-24 LAB
MICROORGANISM SPEC CULT: NORMAL
SPECIMEN DESCRIPTION: NORMAL

## 2023-08-28 ENCOUNTER — HOSPITAL ENCOUNTER (OUTPATIENT)
Age: 23
Discharge: HOME | End: 2023-08-28
Payer: COMMERCIAL

## 2023-08-28 ENCOUNTER — NURSE TRIAGE (OUTPATIENT)
Dept: OTHER | Age: 23
End: 2023-08-28

## 2023-08-28 VITALS
TEMPERATURE: 97.7 F | RESPIRATION RATE: 16 BRPM | DIASTOLIC BLOOD PRESSURE: 70 MMHG | HEART RATE: 110 BPM | SYSTOLIC BLOOD PRESSURE: 123 MMHG

## 2023-08-28 PROCEDURE — 59025 FETAL NON-STRESS TEST: CPT

## 2023-08-28 NOTE — TELEPHONE ENCOUNTER
Answer Assessment - Initial Assessment Questions  1. REASON FOR CALL or QUESTION: \"What is your reason for calling today? \" or \"How can I best help you? \" or \"What question do you have that I can help answer? \"      Patient is 37 weeks and 2 days pregnant. Calling today because she is having contractions that are averaging 4 mins apart. Contractions started at 5:15pm but states its been happening during the day while at work. Patient states she has vivek parker but not more than 4 an hour. Patient has a little discharge, patient states its not amniotic fluid. Patient added that she had now 18 contractions for the past hour. Patient states her contractions are getting stronger. Protocols used:  Information Only Call - No Triage-ADULT-

## 2023-08-28 NOTE — TELEPHONE ENCOUNTER
Referred patient to proceed now to [facility] L&D entering thru the ED per providers' guidelines. Patient verbalized understanding and states that her  will bring her to the hospital. Patient's  requested to be connected to [facility] L&D to inform them that they are coming. Called L&D and talked to [facility], informed of the request, Varsha agreed for call to be transferred. Writer provided Varsha the patient information and transferred call.  YING, RN

## 2023-08-29 NOTE — PROGRESS NOTES
Pt arrived to [facility] department with FOB, with c/o ctx approximately every 4 minutes. States she has been martin for about 1 to 1.5 hours. Denies vaginal bleeding/leaking. States she has been feeling baby move, but noted when contractions occurring baby is more quiet. Instructed pt to change into gown when able and will place pt on monitor. Pt agrees and denies further qyuestions/concerns at this time.

## 2023-08-29 NOTE — PROGRESS NOTES
Discharge instructions given at this time. Pt and FOB agreeable to POC. Both deny further questions/concnerns at this time. Wheelchair offered. Pt denies need. Pt and FOB left unit ambulatory at this time.

## 2023-08-29 NOTE — DISCHARGE INSTRUCTIONS
[address] [facility] [address]   [facility] [phone]   or Azortiz Gordonet [phone]     Dr Maribel Walters  [address] [facility]  [address] [phone]      ACTIVITY LIMITATIONS:  ( x )Up and about as desired and tolerated  (  )Up to bathroom only  (  )Cheyenne Golds on either side  (  )Avoid heavy lifting or exercise  (  )No sex  (  )No nipple stimulation  (  )Complete bedrest  (  )Avoid using stairs  (  )Increase fluids  **DRINK AT LEAST eight-8oz. Glasses of water daily. **    Call your Doctor if:  ( x )Contractions are every 5 minutes apart (from start of one to the start of the next contraction) lasting 60 seconds for at least 1 hour, strong enough you can not walk or talk through the contraction and regular. ( x )Bag of water breaks  ( x )Vaginal bleeding  ( x )Unusual pain occurs  ( x )Decreased fetal movement  (  ) labor:  If you have 4 contractions in an hour    Keep your scheduled follow up appointment. IN CASE OF EMERGENCY CONTACT LABOR AND DELIVERY [phone].

## 2023-08-30 ENCOUNTER — ROUTINE PRENATAL (OUTPATIENT)
Dept: OBGYN CLINIC | Age: 23
End: 2023-08-30

## 2023-08-30 VITALS — DIASTOLIC BLOOD PRESSURE: 60 MMHG | BODY MASS INDEX: 37.98 KG/M2 | SYSTOLIC BLOOD PRESSURE: 100 MMHG | WEIGHT: 249.8 LBS

## 2023-08-30 DIAGNOSIS — Z3A.37 37 WEEKS GESTATION OF PREGNANCY: Primary | ICD-10-CM

## 2023-08-30 PROCEDURE — 0502F SUBSEQUENT PRENATAL CARE: CPT | Performed by: OBSTETRICS & GYNECOLOGY

## 2023-09-05 ENCOUNTER — ROUTINE PRENATAL (OUTPATIENT)
Dept: OBGYN CLINIC | Age: 23
End: 2023-09-05

## 2023-09-05 ENCOUNTER — HOSPITAL ENCOUNTER (OUTPATIENT)
Age: 23
Discharge: HOME | End: 2023-09-06
Attending: OBSTETRICS & GYNECOLOGY | Admitting: OBSTETRICS & GYNECOLOGY
Payer: COMMERCIAL

## 2023-09-05 VITALS
OXYGEN SATURATION: 97 % | RESPIRATION RATE: 18 BRPM | TEMPERATURE: 98.1 F | SYSTOLIC BLOOD PRESSURE: 126 MMHG | DIASTOLIC BLOOD PRESSURE: 79 MMHG | HEART RATE: 104 BPM

## 2023-09-05 VITALS — WEIGHT: 252 LBS | SYSTOLIC BLOOD PRESSURE: 118 MMHG | BODY MASS INDEX: 38.32 KG/M2 | DIASTOLIC BLOOD PRESSURE: 68 MMHG

## 2023-09-05 DIAGNOSIS — Z3A.38 38 WEEKS GESTATION OF PREGNANCY: ICD-10-CM

## 2023-09-05 DIAGNOSIS — Z34.03 ENCOUNTER FOR SUPERVISION OF NORMAL FIRST PREGNANCY IN THIRD TRIMESTER: Primary | ICD-10-CM

## 2023-09-05 PROBLEM — O26.899 ABDOMINAL CRAMPING AFFECTING PREGNANCY, ANTEPARTUM: Status: ACTIVE | Noted: 2023-09-05

## 2023-09-05 PROBLEM — R10.9 ABDOMINAL CRAMPING AFFECTING PREGNANCY, ANTEPARTUM: Status: ACTIVE | Noted: 2023-09-05

## 2023-09-05 LAB
BILIRUB UR QL STRIP: NEGATIVE
CLARITY UR: CLEAR
COLOR UR: YELLOW
GLUCOSE UR STRIP-MCNC: NEGATIVE MG/DL
HGB UR QL STRIP.AUTO: NEGATIVE
KETONES UR STRIP-MCNC: NEGATIVE MG/DL
LEUKOCYTE ESTERASE UR QL STRIP: NEGATIVE
NITRITE UR QL STRIP: NEGATIVE
PH UR STRIP: 7 [PH] (ref 5–9)
PROT UR STRIP-MCNC: NEGATIVE MG/DL
SP GR UR STRIP: 1.01 (ref 1.01–1.02)
UROBILINOGEN UR STRIP-ACNC: NORMAL EU/DL (ref 0–1)

## 2023-09-05 PROCEDURE — 81003 URINALYSIS AUTO W/O SCOPE: CPT

## 2023-09-05 PROCEDURE — 0502F SUBSEQUENT PRENATAL CARE: CPT | Performed by: NURSE PRACTITIONER

## 2023-09-05 NOTE — PROGRESS NOTES
Shyanne Goldstein is a 23 y.o. female 38w3d      The patient was seen and evaluated. There was positive fetal movements. No contractions or leakage of fluid. Signs and symptoms of labor were reviewed. The S/S of Pre-Eclampsia were reviewed with the patient in detail. She is to report any of these if they occur. She currently denies any of these. The patient was instructed on fetal kick counts and is encouraged to complete every 8 hours. She was instructed that the baby should move at a minimum of ten times within one hour after a meal. The patient was instructed to lay down on her left side twenty minutes after eating and count movements for up to one hour with a target value of ten movements. She was instructed to notify the office if she did not make that target after two attempts or if after any attempt there was less than four movements. The patient reports that the fetal movement targets have been made Yes. Allergies: Allergies as of 09/05/2023 - Fully Reviewed 09/05/2023   Allergen Reaction Noted    Pcn [penicillins] Hives and Shortness Of Breath 01/28/2021         Group Beta Strep collection was completed. Yes  GBS Results:   Hospital Outpatient Visit on 08/21/2023   Component Date Value Ref Range Status    Specimen Description 08/21/2023 . VAGINA   Final    Culture 08/21/2023 NEGATIVE FOR GROUP B STREPTOCOCCI   Final       If not done prior in pregnancy or if had previous positive result in this pregnancy, GC/CT were collected. No.    The patient was counseled on the mandatory call ahead policy. She has been instructed to call the office at anytime prior to going into the hospital so the on-call physician may direct her to the appropriate facility for care. Exceptions were reviewed including but not limited to: Decreased fetal movement, vaginal Bleeding or hemorrhage, trauma, readily expectant delivery, or any instance where she feels 911 should be utilized.       The patient was counseled

## 2023-09-06 PROCEDURE — 59025 FETAL NON-STRESS TEST: CPT

## 2023-09-06 PROCEDURE — 99213 OFFICE O/P EST LOW 20 MIN: CPT

## 2023-09-06 NOTE — PROGRESS NOTES
Patient presents to L&D today for rule out uterine contractions    IUP at 38 4/7 weeks     NST is reactive. Quality of tracing is satisfactory.

## 2023-09-06 NOTE — FLOWSHEET NOTE
RN discussed discharge paperwork with patient. Patient has no questions at this time. Patient ambulates off unit in stable condition.

## 2023-09-06 NOTE — FLOWSHEET NOTE
RN spoke with provider about pt's unchanged SVE. RN to monitor pt for another hour and recheck and if no change, pt can be discharged.

## 2023-09-06 NOTE — FLOWSHEET NOTE
RN reports to unit c/o ctx that started around 1400 today. She states they have been getting closer together. She tried laying down and drinking cold water, but they continued. Pt denies any vaginal bleeding or leaking fluid. She states she has been feeling baby move.

## 2023-09-06 NOTE — DISCHARGE INSTRUCTIONS
[address] [facility] [phone]   or Terri Draper [phone]     Dr Shelton Catalan [facility] [address] [phone]      ACTIVITY LIMITATIONS:  ( X )Up and about as desired and tolerated  (  )Up to bathroom only  (  )Staci Aiden on either side  (  )Avoid heavy lifting or exercise  (  )No sex  (  )No nipple stimulation  (  )Complet bedrest  (  )Avoid using stairs  ( X )Increase fluids  **DRINK AT LEAST eight-8oz. Glasses of water daily. **    Call your Doctor if:  ( X )Contractions are every 5 minutes apart (from start of one to the start of the next contraction) lasting 60 seconds for at least 1 hour, strong enough you can not walk or talk through the contraction and regular. ( X )Bag of water breaks  ( X )Vaginal bleeding  ( X )Unusual pain occurs  ( X )Decreased fetal movement  (  ) labor:  If you have 4 contractions in an hour    Keep your scheduled follow up appointment. Or call for a follow up this week if contractions continue. IN CASE OF EMERGENCY CONTACT LABOR AND DELIVERY [phone].

## 2023-09-11 PROBLEM — Z3A.39 39 WEEKS GESTATION OF PREGNANCY: Status: ACTIVE | Noted: 2023-09-11

## 2023-09-12 ENCOUNTER — ROUTINE PRENATAL (OUTPATIENT)
Dept: OBGYN CLINIC | Age: 23
End: 2023-09-12

## 2023-09-12 VITALS — DIASTOLIC BLOOD PRESSURE: 84 MMHG | WEIGHT: 252.8 LBS | BODY MASS INDEX: 38.44 KG/M2 | SYSTOLIC BLOOD PRESSURE: 112 MMHG

## 2023-09-12 DIAGNOSIS — Z34.03 ENCOUNTER FOR SUPERVISION OF NORMAL FIRST PREGNANCY IN THIRD TRIMESTER: Primary | ICD-10-CM

## 2023-09-12 PROCEDURE — 0502F SUBSEQUENT PRENATAL CARE: CPT | Performed by: ADVANCED PRACTICE MIDWIFE

## 2023-09-17 ENCOUNTER — ANESTHESIA (OUTPATIENT)
Dept: LABOR AND DELIVERY | Age: 23
End: 2023-09-17
Payer: COMMERCIAL

## 2023-09-17 ENCOUNTER — ANESTHESIA EVENT (OUTPATIENT)
Dept: LABOR AND DELIVERY | Age: 23
End: 2023-09-17
Payer: COMMERCIAL

## 2023-09-17 ENCOUNTER — HOSPITAL ENCOUNTER (INPATIENT)
Age: 23
LOS: 2 days | Discharge: HOME | End: 2023-09-19
Attending: OBSTETRICS & GYNECOLOGY | Admitting: OBSTETRICS & GYNECOLOGY
Payer: COMMERCIAL

## 2023-09-17 PROBLEM — O47.9 UTERINE CONTRACTIONS DURING PREGNANCY: Status: ACTIVE | Noted: 2023-09-17

## 2023-09-17 LAB
ABO + RH BLD: NORMAL
ARM BAND NUMBER: NORMAL
BACTERIA URNS QL MICRO: ABNORMAL
BASOPHILS # BLD: <0.03 K/UL (ref 0–0.2)
BASOPHILS NFR BLD: 0 % (ref 0–2)
BILIRUB UR QL STRIP: NEGATIVE
BLOOD BANK SAMPLE EXPIRATION: NORMAL
BLOOD GROUP ANTIBODIES SERPL: NEGATIVE
CLARITY UR: ABNORMAL
COLOR UR: YELLOW
EOSINOPHIL # BLD: <0.03 K/UL (ref 0–0.44)
EOSINOPHILS RELATIVE PERCENT: 0 % (ref 1–4)
EPI CELLS #/AREA URNS HPF: ABNORMAL /HPF (ref 0–25)
ERYTHROCYTE [DISTWIDTH] IN BLOOD BY AUTOMATED COUNT: 12.6 % (ref 11.8–14.4)
GLUCOSE UR STRIP-MCNC: NEGATIVE MG/DL
HCT VFR BLD AUTO: 39.6 % (ref 36.3–47.1)
HGB BLD-MCNC: 13.9 G/DL (ref 11.9–15.1)
HGB UR QL STRIP.AUTO: NEGATIVE
IMM GRANULOCYTES # BLD AUTO: 0.05 K/UL (ref 0–0.3)
IMM GRANULOCYTES NFR BLD: 0 %
KETONES UR STRIP-MCNC: NEGATIVE MG/DL
LEUKOCYTE ESTERASE UR QL STRIP: ABNORMAL
LYMPHOCYTES NFR BLD: 1.41 K/UL (ref 1.1–3.7)
LYMPHOCYTES RELATIVE PERCENT: 9 % (ref 24–43)
MCH RBC QN AUTO: 30.3 PG (ref 25.2–33.5)
MCHC RBC AUTO-ENTMCNC: 35.1 G/DL (ref 28.4–34.8)
MCV RBC AUTO: 86.5 FL (ref 82.6–102.9)
MONOCYTES NFR BLD: 0.49 K/UL (ref 0.1–1.2)
MONOCYTES NFR BLD: 3 % (ref 3–12)
MUCOUS THREADS URNS QL MICRO: ABNORMAL
NEUTROPHILS NFR BLD: 88 % (ref 36–65)
NEUTS SEG NFR BLD: 13.88 K/UL (ref 1.5–8.1)
NITRITE UR QL STRIP: NEGATIVE
NRBC BLD-RTO: 0 PER 100 WBC
PH UR STRIP: 6.5 [PH] (ref 5–9)
PLATELET # BLD AUTO: 197 K/UL (ref 138–453)
PMV BLD AUTO: 12.3 FL (ref 8.1–13.5)
PROT UR STRIP-MCNC: NEGATIVE MG/DL
RBC # BLD AUTO: 4.58 M/UL (ref 3.95–5.11)
RBC #/AREA URNS HPF: ABNORMAL /HPF (ref 0–2)
SP GR UR STRIP: 1.02 (ref 1.01–1.02)
UROBILINOGEN UR STRIP-ACNC: NORMAL EU/DL (ref 0–1)
WBC #/AREA URNS HPF: ABNORMAL /HPF (ref 0–5)
WBC OTHER # BLD: 15.9 K/UL (ref 3.5–11.3)

## 2023-09-17 PROCEDURE — 3700000025 EPIDURAL BLOCK: Performed by: NURSE ANESTHETIST, CERTIFIED REGISTERED

## 2023-09-17 PROCEDURE — 81001 URINALYSIS AUTO W/SCOPE: CPT

## 2023-09-17 PROCEDURE — 86901 BLOOD TYPING SEROLOGIC RH(D): CPT

## 2023-09-17 PROCEDURE — 86900 BLOOD TYPING SEROLOGIC ABO: CPT

## 2023-09-17 PROCEDURE — 6360000002 HC RX W HCPCS: Performed by: NURSE ANESTHETIST, CERTIFIED REGISTERED

## 2023-09-17 PROCEDURE — 1220000000 HC SEMI PRIVATE OB R&B

## 2023-09-17 PROCEDURE — 2500000003 HC RX 250 WO HCPCS: Performed by: NURSE ANESTHETIST, CERTIFIED REGISTERED

## 2023-09-17 PROCEDURE — 2580000003 HC RX 258: Performed by: OBSTETRICS & GYNECOLOGY

## 2023-09-17 PROCEDURE — 85025 COMPLETE CBC W/AUTO DIFF WBC: CPT

## 2023-09-17 PROCEDURE — 86850 RBC ANTIBODY SCREEN: CPT

## 2023-09-17 PROCEDURE — 6360000002 HC RX W HCPCS: Performed by: OBSTETRICS & GYNECOLOGY

## 2023-09-17 PROCEDURE — 36415 COLL VENOUS BLD VENIPUNCTURE: CPT

## 2023-09-17 RX ORDER — CARBOPROST TROMETHAMINE 250 UG/ML
250 INJECTION, SOLUTION INTRAMUSCULAR PRN
Status: DISCONTINUED | OUTPATIENT
Start: 2023-09-17 | End: 2023-09-19 | Stop reason: HOSPADM

## 2023-09-17 RX ORDER — SEVOFLURANE 250 ML/250ML
1 LIQUID RESPIRATORY (INHALATION) CONTINUOUS PRN
Status: DISCONTINUED | OUTPATIENT
Start: 2023-09-17 | End: 2023-09-19 | Stop reason: HOSPADM

## 2023-09-17 RX ORDER — SODIUM CHLORIDE, SODIUM LACTATE, POTASSIUM CHLORIDE, AND CALCIUM CHLORIDE .6; .31; .03; .02 G/100ML; G/100ML; G/100ML; G/100ML
1000 INJECTION, SOLUTION INTRAVENOUS PRN
Status: DISCONTINUED | OUTPATIENT
Start: 2023-09-17 | End: 2023-09-19 | Stop reason: HOSPADM

## 2023-09-17 RX ORDER — MISOPROSTOL 100 UG/1
900 TABLET ORAL PRN
Status: DISCONTINUED | OUTPATIENT
Start: 2023-09-17 | End: 2023-09-19 | Stop reason: HOSPADM

## 2023-09-17 RX ORDER — EPHEDRINE SULFATE/0.9% NACL/PF 25 MG/5 ML
10 SYRINGE (ML) INTRAVENOUS PRN
Status: DISCONTINUED | OUTPATIENT
Start: 2023-09-17 | End: 2023-09-19 | Stop reason: HOSPADM

## 2023-09-17 RX ORDER — LIDOCAINE HYDROCHLORIDE 20 MG/ML
INJECTION, SOLUTION EPIDURAL; INFILTRATION; INTRACAUDAL; PERINEURAL PRN
Status: DISCONTINUED | OUTPATIENT
Start: 2023-09-17 | End: 2023-09-18 | Stop reason: SDUPTHER

## 2023-09-17 RX ORDER — BUTORPHANOL TARTRATE 1 MG/ML
1 INJECTION, SOLUTION INTRAMUSCULAR; INTRAVENOUS
Status: DISCONTINUED | OUTPATIENT
Start: 2023-09-17 | End: 2023-09-19 | Stop reason: HOSPADM

## 2023-09-17 RX ORDER — METHYLERGONOVINE MALEATE 0.2 MG/ML
200 INJECTION INTRAVENOUS PRN
Status: DISCONTINUED | OUTPATIENT
Start: 2023-09-17 | End: 2023-09-19 | Stop reason: HOSPADM

## 2023-09-17 RX ORDER — ACETAMINOPHEN 325 MG/1
650 TABLET ORAL EVERY 4 HOURS PRN
Status: DISCONTINUED | OUTPATIENT
Start: 2023-09-17 | End: 2023-09-19 | Stop reason: HOSPADM

## 2023-09-17 RX ORDER — ROPIVACAINE HYDROCHLORIDE 2 MG/ML
10 INJECTION, SOLUTION EPIDURAL; INFILTRATION; PERINEURAL CONTINUOUS
Status: DISCONTINUED | OUTPATIENT
Start: 2023-09-17 | End: 2023-09-19 | Stop reason: HOSPADM

## 2023-09-17 RX ORDER — HYDROXYZINE HYDROCHLORIDE 50 MG/ML
50 INJECTION, SOLUTION INTRAMUSCULAR
Status: COMPLETED | OUTPATIENT
Start: 2023-09-17 | End: 2023-09-17

## 2023-09-17 RX ORDER — SODIUM CHLORIDE, SODIUM LACTATE, POTASSIUM CHLORIDE, AND CALCIUM CHLORIDE .6; .31; .03; .02 G/100ML; G/100ML; G/100ML; G/100ML
500 INJECTION, SOLUTION INTRAVENOUS PRN
Status: DISCONTINUED | OUTPATIENT
Start: 2023-09-17 | End: 2023-09-19 | Stop reason: HOSPADM

## 2023-09-17 RX ORDER — ROPIVACAINE HYDROCHLORIDE 2 MG/ML
INJECTION, SOLUTION EPIDURAL; INFILTRATION; PERINEURAL
Status: COMPLETED
Start: 2023-09-17 | End: 2023-09-17

## 2023-09-17 RX ORDER — SODIUM CHLORIDE 0.9 % (FLUSH) 0.9 %
5-40 SYRINGE (ML) INJECTION PRN
Status: DISCONTINUED | OUTPATIENT
Start: 2023-09-17 | End: 2023-09-19 | Stop reason: HOSPADM

## 2023-09-17 RX ORDER — SODIUM CHLORIDE 0.9 % (FLUSH) 0.9 %
5-40 SYRINGE (ML) INJECTION EVERY 12 HOURS SCHEDULED
Status: DISCONTINUED | OUTPATIENT
Start: 2023-09-17 | End: 2023-09-19 | Stop reason: HOSPADM

## 2023-09-17 RX ORDER — DIPHENHYDRAMINE HYDROCHLORIDE 50 MG/ML
25 INJECTION INTRAMUSCULAR; INTRAVENOUS ONCE
Status: COMPLETED | OUTPATIENT
Start: 2023-09-17 | End: 2023-09-17

## 2023-09-17 RX ORDER — ROPIVACAINE HYDROCHLORIDE 2 MG/ML
INJECTION, SOLUTION EPIDURAL; INFILTRATION; PERINEURAL CONTINUOUS PRN
Status: DISCONTINUED | OUTPATIENT
Start: 2023-09-17 | End: 2023-09-18 | Stop reason: SDUPTHER

## 2023-09-17 RX ORDER — SODIUM CHLORIDE 9 MG/ML
INJECTION, SOLUTION INTRAVENOUS PRN
Status: DISCONTINUED | OUTPATIENT
Start: 2023-09-17 | End: 2023-09-19 | Stop reason: HOSPADM

## 2023-09-17 RX ORDER — LIDOCAINE HYDROCHLORIDE 20 MG/ML
INJECTION, SOLUTION EPIDURAL; INFILTRATION; INTRACAUDAL; PERINEURAL
Status: COMPLETED
Start: 2023-09-17 | End: 2023-09-17

## 2023-09-17 RX ORDER — ONDANSETRON 2 MG/ML
4 INJECTION INTRAMUSCULAR; INTRAVENOUS EVERY 6 HOURS PRN
Status: DISCONTINUED | OUTPATIENT
Start: 2023-09-17 | End: 2023-09-19 | Stop reason: HOSPADM

## 2023-09-17 RX ORDER — NALOXONE HYDROCHLORIDE 0.4 MG/ML
INJECTION, SOLUTION INTRAMUSCULAR; INTRAVENOUS; SUBCUTANEOUS PRN
Status: DISCONTINUED | OUTPATIENT
Start: 2023-09-17 | End: 2023-09-19 | Stop reason: HOSPADM

## 2023-09-17 RX ORDER — NALBUPHINE HYDROCHLORIDE 10 MG/ML
10 INJECTION, SOLUTION INTRAMUSCULAR; INTRAVENOUS; SUBCUTANEOUS
Status: DISCONTINUED | OUTPATIENT
Start: 2023-09-17 | End: 2023-09-19 | Stop reason: HOSPADM

## 2023-09-17 RX ORDER — SODIUM CHLORIDE, SODIUM LACTATE, POTASSIUM CHLORIDE, CALCIUM CHLORIDE 600; 310; 30; 20 MG/100ML; MG/100ML; MG/100ML; MG/100ML
INJECTION, SOLUTION INTRAVENOUS CONTINUOUS
Status: DISCONTINUED | OUTPATIENT
Start: 2023-09-17 | End: 2023-09-19 | Stop reason: HOSPADM

## 2023-09-17 RX ADMIN — SODIUM CHLORIDE, POTASSIUM CHLORIDE, SODIUM LACTATE AND CALCIUM CHLORIDE: 600; 310; 30; 20 INJECTION, SOLUTION INTRAVENOUS at 19:48

## 2023-09-17 RX ADMIN — ROPIVACAINE HYDROCHLORIDE 10 ML/HR: 2 INJECTION, SOLUTION EPIDURAL; INFILTRATION at 20:47

## 2023-09-17 RX ADMIN — LIDOCAINE HYDROCHLORIDE 2.5 ML: 20 INJECTION, SOLUTION EPIDURAL; INFILTRATION; INTRACAUDAL; PERINEURAL at 14:56

## 2023-09-17 RX ADMIN — ROPIVACAINE HYDROCHLORIDE 10 ML/HR: 2 INJECTION, SOLUTION EPIDURAL; INFILTRATION at 15:00

## 2023-09-17 RX ADMIN — SODIUM CHLORIDE, POTASSIUM CHLORIDE, SODIUM LACTATE AND CALCIUM CHLORIDE: 600; 310; 30; 20 INJECTION, SOLUTION INTRAVENOUS at 21:41

## 2023-09-17 RX ADMIN — DIPHENHYDRAMINE HYDROCHLORIDE 25 MG: 50 INJECTION INTRAMUSCULAR; INTRAVENOUS at 21:52

## 2023-09-17 RX ADMIN — SODIUM CHLORIDE, POTASSIUM CHLORIDE, SODIUM LACTATE AND CALCIUM CHLORIDE: 600; 310; 30; 20 INJECTION, SOLUTION INTRAVENOUS at 14:31

## 2023-09-17 RX ADMIN — LIDOCAINE HYDROCHLORIDE 2.5 ML: 20 INJECTION, SOLUTION EPIDURAL; INFILTRATION; INTRACAUDAL; PERINEURAL at 14:58

## 2023-09-17 RX ADMIN — SODIUM CHLORIDE, POTASSIUM CHLORIDE, SODIUM LACTATE AND CALCIUM CHLORIDE 1000 ML: 600; 310; 30; 20 INJECTION, SOLUTION INTRAVENOUS at 14:00

## 2023-09-17 RX ADMIN — Medication 1 MILLI-UNITS/MIN: at 16:40

## 2023-09-17 RX ADMIN — HYDROXYZINE HYDROCHLORIDE 50 MG: 50 INJECTION, SOLUTION INTRAMUSCULAR at 12:13

## 2023-09-17 ASSESSMENT — PAIN SCALES - GENERAL
PAINLEVEL_OUTOF10: 3
PAINLEVEL_OUTOF10: 3

## 2023-09-17 NOTE — PROGRESS NOTES
Patient states she started martin last evening with contractions getting stronger around 10pm. Pt currently rates contractions a \"9\" at present, states she feels them in her lower abdomen and her back.

## 2023-09-17 NOTE — PROGRESS NOTES
Dr. Juli Jolley present in [facility] dept. Updated on pt's pain rating. Orders received to put in IV - plan to admit patient and Dr. Juli Jolley will AROM her. Pt updated on plan of care and agreeable.

## 2023-09-17 NOTE — ANESTHESIA PROCEDURE NOTES
Epidural Block    Patient location during procedure: OB  Start time: 9/17/2023 2:52 PM  End time: 9/17/2023 2:55 PM  Reason for block: labor epidural  Staffing  Performed: resident/CRNA   Resident/CRNA: DONATO Spence CRNA  Performed by: DONATO Spence CRNA  Authorized by: DONATO Spence CRNA    Epidural  Patient position: sitting  Prep: ChloraPrep  Patient monitoring: continuous pulse ox and frequent blood pressure checks  Approach: midline  Location: L4-5  Injection technique: ALBERT air  Provider prep: mask and sterile gloves  Needle  Needle type: Tuohy   Needle gauge: 17 G  Needle length: 3.5 in  Needle insertion depth: 4 cm  Catheter type: side hole  Catheter size: 19 G  Catheter at skin depth: 13 cm  Test dose: negative (3 ml then 2 ml)  Kit: Denis  Lot number: [id number]  Expiration date: 11/30/2024Catheter Secured: tape and tegaderm  Assessment  Sensory level: T8  Hemodynamics: stable  Attempts: 1  Outcomes: uncomplicated and patient tolerated procedure well  Additional Notes  Instructed pt on PCEA usage, verbalizes understanding. Patient denies any other needs and reports being comfortable at this time.    Preanesthetic Checklist  Completed: patient identified, IV checked, site marked, risks and benefits discussed, surgical/procedural consents, equipment checked, pre-op evaluation, timeout performed, anesthesia consent given, oxygen available and monitors applied/VS acknowledged

## 2023-09-17 NOTE — PROGRESS NOTES
Patient sitting in shower, has music playing, is breathing well through contractions, rates contraction pain a \"7\", states being in the shower \"helps a lot\".

## 2023-09-17 NOTE — FLOWSHEET NOTE
1443- JUANY Haro Guillermo arrives to room  1447- timeout done  1452- start procedure  1455- epidural in  1456- test dose given HR 87

## 2023-09-17 NOTE — PROGRESS NOTES
Patient states she thinks her water broke when she vomited. Nitrazine swab inserted into vaginal canal and RN instructs pt to cough a few times. No fluid noted on perineum, swab negative when withdrawn.

## 2023-09-17 NOTE — PLAN OF CARE
Problem: Pain  Goal: Verbalizes/displays adequate comfort level or baseline comfort level  2023 1332 by Ava Bunch RN  Outcome: Progressing  2023 1055 by Tomma Boast, RN  Outcome: Progressing     Problem: Vaginal Birth or  Section  Goal: Fetal and maternal status remain reassuring during the birth process  Description:  Birth OB-Pregnancy care plan goal which identifies if the fetal and maternal status remain reassuring during the birth process  2023 1055 by Tomma Boast, RN  Outcome: Progressing     Problem: Infection - Adult  Goal: Absence of infection at discharge  2023 1055 by Tomma Boast, RN  Outcome: Progressing  Goal: Absence of infection during hospitalization  2023 1332 by Ava Bunch RN  Outcome: Progressing  2023 1055 by Tomma Boast, RN  Outcome: Progressing  Goal: Absence of fever/infection during anticipated neutropenic period  2023 1055 by Tomma Boast, RN  Outcome: Progressing     Problem: Safety - Adult  Goal: Free from fall injury  2023 1332 by Ava Bunch RN  Outcome: Progressing  2023 1055 by Tomma Boast, RN  Outcome: Progressing     Problem: Discharge Planning  Goal: Discharge to home or other facility with appropriate resources  2023 1332 by Ava Bunch RN  Outcome: Progressing  2023 1055 by Tomma Boast, RN  Outcome: Progressing

## 2023-09-17 NOTE — PROGRESS NOTES
Writer called Dr. Rhoda Holland to inform provider that pitocin infusion has been shut off due to re-occurring late decelerations. Writer also informed provider that oxygen has been applied to pt as well. Provider updated on last BP and SVE.

## 2023-09-17 NOTE — H&P
HISTORY AND PHYSICAL             Date: 9/17/2023        Patient Name: Cassius Back     YOB: 2000      Age:  23 y.o. Chief Complaint     Chief Complaint   Patient presents with    Contractions           History Obtained From   patient    History of Present Illness   Pt presents with contractions    Past Medical History     Past Medical History:   Diagnosis Date    Infertility, female     PCOS (polycystic ovarian syndrome)         Past Surgical History     Past Surgical History:   Procedure Laterality Date    CYST REMOVAL      head    LAPAROSCOPY N/A 11/7/2022    LAPAROSCOPY EXPLORATORY- DIAGNOSTIC , LYSIS OF ADHESIONS OF ENDOMETRIOSIS, CHROMOPERTUBATION performed by Lorraine Aguilera DO at [address] [place] [address]        Medications Prior to Admission     Prior to Admission medications    Medication Sig Start Date End Date Taking? Authorizing Provider   Prenatal Vit-Fe Fumarate-FA (PRENATAL VITAMIN PO) Take by mouth    Historical Provider, MD        Allergies   Pcn [penicillins]    Social History     Social History       Tobacco History       Smoking Status  Never      Smokeless Tobacco Use  Never              Alcohol History       Alcohol Use Status  Not Currently Comment  rare              Drug Use       Drug Use Status  Never              Sexual Activity       Sexually Active  Yes Partners  Male                    Family History     Family History   Problem Relation Age of Onset    Miscarriages / Stillbirths Mother     Hypertension Mother     No Known Problems Father     Asthma Sister     Polycystic Ovary Syndrome Sister     Asthma Brother     No Known Problems Brother     Breast Cancer Maternal Grandmother     Heart Failure Maternal Grandfather     No Known Problems Paternal Grandmother     Lung Cancer Paternal Grandfather        Review of Systems   Review of Systems   Constitutional:  Negative for chills and fever. Genitourinary:  Positive for pelvic pain.  Negative for dysuria, vaginal bleeding and

## 2023-09-17 NOTE — PROGRESS NOTES
Writer updates Dr. Elsa Pappas on SVE, patient thinking water broke but nitrazine swab negative, Pt rating pain \"9\", patient open to the idea of pitocin but would prefer Dr. Elsa Pappas to try AROM first and pt requesting intermittent monitoring and getting up to shower. Order received for intermittent monitoring.

## 2023-09-18 PROBLEM — Z3A.40 40 WEEKS GESTATION OF PREGNANCY: Status: ACTIVE | Noted: 2023-09-18

## 2023-09-18 PROCEDURE — 6370000000 HC RX 637 (ALT 250 FOR IP): Performed by: OBSTETRICS & GYNECOLOGY

## 2023-09-18 PROCEDURE — 51702 INSERT TEMP BLADDER CATH: CPT

## 2023-09-18 PROCEDURE — 7200000001 HC VAGINAL DELIVERY

## 2023-09-18 PROCEDURE — 10907ZC DRAINAGE OF AMNIOTIC FLUID, THERAPEUTIC FROM PRODUCTS OF CONCEPTION, VIA NATURAL OR ARTIFICIAL OPENING: ICD-10-PCS | Performed by: OBSTETRICS & GYNECOLOGY

## 2023-09-18 PROCEDURE — 0KQM0ZZ REPAIR PERINEUM MUSCLE, OPEN APPROACH: ICD-10-PCS | Performed by: OBSTETRICS & GYNECOLOGY

## 2023-09-18 PROCEDURE — 6360000002 HC RX W HCPCS: Performed by: OBSTETRICS & GYNECOLOGY

## 2023-09-18 PROCEDURE — 1220000000 HC SEMI PRIVATE OB R&B

## 2023-09-18 RX ORDER — MODIFIED LANOLIN
OINTMENT (GRAM) TOPICAL PRN
Status: CANCELLED | OUTPATIENT
Start: 2023-09-18

## 2023-09-18 RX ORDER — METHYLERGONOVINE MALEATE 0.2 MG/ML
200 INJECTION INTRAVENOUS PRN
Status: CANCELLED | OUTPATIENT
Start: 2023-09-18

## 2023-09-18 RX ORDER — SODIUM CHLORIDE 0.9 % (FLUSH) 0.9 %
5-40 SYRINGE (ML) INJECTION PRN
Status: CANCELLED | OUTPATIENT
Start: 2023-09-18

## 2023-09-18 RX ORDER — CARBOPROST TROMETHAMINE 250 UG/ML
250 INJECTION, SOLUTION INTRAMUSCULAR PRN
Status: CANCELLED | OUTPATIENT
Start: 2023-09-18

## 2023-09-18 RX ORDER — DOCUSATE SODIUM 100 MG/1
100 CAPSULE, LIQUID FILLED ORAL 2 TIMES DAILY PRN
Status: CANCELLED | OUTPATIENT
Start: 2023-09-18

## 2023-09-18 RX ORDER — FUROSEMIDE 10 MG/ML
10 INJECTION INTRAMUSCULAR; INTRAVENOUS ONCE
Status: COMPLETED | OUTPATIENT
Start: 2023-09-18 | End: 2023-09-18

## 2023-09-18 RX ORDER — MISOPROSTOL 100 UG/1
200 TABLET ORAL PRN
Status: CANCELLED | OUTPATIENT
Start: 2023-09-18

## 2023-09-18 RX ORDER — IBUPROFEN 800 MG/1
800 TABLET ORAL EVERY 8 HOURS PRN
Status: DISCONTINUED | OUTPATIENT
Start: 2023-09-18 | End: 2023-09-19 | Stop reason: HOSPADM

## 2023-09-18 RX ORDER — SODIUM CHLORIDE 9 MG/ML
INJECTION, SOLUTION INTRAVENOUS PRN
Status: CANCELLED | OUTPATIENT
Start: 2023-09-18

## 2023-09-18 RX ORDER — MODIFIED LANOLIN
OINTMENT (GRAM) TOPICAL PRN
Status: DISCONTINUED | OUTPATIENT
Start: 2023-09-18 | End: 2023-09-19 | Stop reason: HOSPADM

## 2023-09-18 RX ORDER — SODIUM CHLORIDE 0.9 % (FLUSH) 0.9 %
5-40 SYRINGE (ML) INJECTION EVERY 12 HOURS SCHEDULED
Status: CANCELLED | OUTPATIENT
Start: 2023-09-18

## 2023-09-18 RX ORDER — MISOPROSTOL 100 UG/1
800 TABLET ORAL PRN
Status: CANCELLED | OUTPATIENT
Start: 2023-09-18

## 2023-09-18 RX ORDER — ACETAMINOPHEN 500 MG
1000 TABLET ORAL EVERY 8 HOURS PRN
Status: CANCELLED | OUTPATIENT
Start: 2023-09-18

## 2023-09-18 RX ORDER — IBUPROFEN 800 MG/1
800 TABLET ORAL EVERY 8 HOURS
Status: CANCELLED | OUTPATIENT
Start: 2023-09-18

## 2023-09-18 RX ADMIN — Medication: at 19:12

## 2023-09-18 RX ADMIN — IBUPROFEN 800 MG: 800 TABLET, FILM COATED ORAL at 07:24

## 2023-09-18 RX ADMIN — ACETAMINOPHEN 650 MG: 325 TABLET ORAL at 16:26

## 2023-09-18 RX ADMIN — FUROSEMIDE 10 MG: 10 INJECTION, SOLUTION INTRAMUSCULAR; INTRAVENOUS at 00:26

## 2023-09-18 ASSESSMENT — PAIN DESCRIPTION - DESCRIPTORS
DESCRIPTORS: CRAMPING
DESCRIPTORS: ACHING

## 2023-09-18 ASSESSMENT — PAIN DESCRIPTION - LOCATION
LOCATION: VAGINA;BACK
LOCATION: ABDOMEN;PERINEUM

## 2023-09-18 ASSESSMENT — PAIN SCALES - GENERAL
PAINLEVEL_OUTOF10: 5
PAINLEVEL_OUTOF10: 3

## 2023-09-18 ASSESSMENT — PAIN DESCRIPTION - ORIENTATION
ORIENTATION: LOWER
ORIENTATION: LOWER

## 2023-09-18 NOTE — ANESTHESIA POSTPROCEDURE EVALUATION
Department of Anesthesiology  Postprocedure Note    Patient: Renetta Jaimes  MRN: [id number]  YOB: 2000  Date of evaluation: 9/18/2023      Procedure Summary     Date: 09/17/23 Room / Location:     Anesthesia Start: 1443 Anesthesia Stop: 09/18/23 0239    Procedure: Labor Analgesia Diagnosis:     Scheduled Providers:  Responsible Provider: DONATO Ponce CRNA    Anesthesia Type: epidural ASA Status: 2          Anesthesia Type: No value filed.     Babar Phase I:      Babar Phase II:        Anesthesia Post Evaluation    Patient location during evaluation: floor  Patient participation: complete - patient participated  Level of consciousness: awake and alert  Pain score: 2  Airway patency: patent  Nausea & Vomiting: no vomiting and no nausea  Complications: no  Cardiovascular status: blood pressure returned to baseline  Respiratory status: acceptable  Hydration status: stable  Multimodal analgesia pain management approach  Pain management: adequate

## 2023-09-18 NOTE — FLOWSHEET NOTE
Pt to OB unit with complaint of contractions since 11am that became more painful around 10pm. Pt states the contraction are about 3 minutes apart at this time.

## 2023-09-18 NOTE — LACTATION NOTE
Lactation education:    [x] Latch/ good latch vs shallow latch/ steps to obtaining deep latch    [x] How to know if infant is eating enough/ feedings per 24 hours, wet/dirty diapers    [x] Feeding/satiety cues      Lactation education resources given:     [x]  How to Breastfeed your baby - [address] [place] [address] publication      [x]  Follow up support information    [x]  Breast milk storage guidelines - [facility]    [x]  Breastpump cleaning guidelines - [facility]     [x]  Breastfeeding & Safe Sleep handout - [address] [place] [address] publication    [x]  Calling All Dads! Handout - [address] [place] [address] publication      []  Breast and Nipple Care - [facility]     []  [address]    []  [address]    []  Going Back to Work - [facility]    []  Preventing Engorgement - [facility]    Supplies given:    []  Brush, soap and basin for breastpump cleaning    []  Insurance pump provided     []  [facility] pump set up for patient to use    Explained to patient, patient verbalizes understanding.         Signed:  Brenda Pretty RN, BSN, IBCLC

## 2023-09-18 NOTE — PLAN OF CARE
Problem: Pain  Goal: Verbalizes/displays adequate comfort level or baseline comfort level  2023 by Geneva Rivers RN  Outcome: Progressing  2023 by Chance Christy RN  Outcome: Progressing     Problem: Vaginal Birth or  Section  Goal: Fetal and maternal status remain reassuring during the birth process  Description:  Birth OB-Pregnancy care plan goal which identifies if the fetal and maternal status remain reassuring during the birth process  2023 by Geneva Rivers RN  Outcome: Progressing  2023 by Chance Christy RN  Outcome: Progressing     Problem: Postpartum  Goal: Experiences normal postpartum course  Description:  Postpartum OB-Pregnancy care plan goal which identifies if the mother is experiencing a normal postpartum course  2023 by Geneva Rivers RN  Outcome: Progressing  2023 by Chance Christy RN  Outcome: Progressing  Goal: Appropriate maternal -  bonding  Description:  Postpartum OB-Pregnancy care plan goal which identifies if the mother and  are bonding appropriately  2023 by Geneva Rivers RN  Outcome: Progressing  2023 by Chance Christy RN  Outcome: Progressing  Goal: Establishment of infant feeding pattern  Description:  Postpartum OB-Pregnancy care plan goal which identifies if the mother is establishing a feeding pattern with their   2023 by Geneva Rivers RN  Outcome: Progressing  2023 by Chance Christy RN  Outcome: Progressing  Goal: Incisions, wounds, or drain sites healing without S/S of infection  2023 by Geneva Rivers RN  Outcome: Progressing  2023 by Chance Christy RN  Outcome: Progressing     Problem: Infection - Adult  Goal: Absence of infection at discharge  2023 by Geneva Rivers RN  Outcome: Progressing  2023 by Chance Christy RN  Outcome: Progressing  Goal: Absence of infection during

## 2023-09-18 NOTE — L&D DELIVERY NOTE
Afshan, Galo Go [[id number]]      Labor Events     Labor: No   Steroids: None  Cervical Ripening Date/Time:      Antibiotics Received during Labor: No  Rupture Identifier: Sac 1  Rupture Date/Time:  23 11:53:00   Rupture Type: AROM  Fluid Color: Meconium  Meconium Consistency: Thin  Fluid Odor: None  Fluid Volume:  Moderate  Augmentation: AROM, Oxytocin  Labor Complications: None              Anesthesia    Method: Epidural       Labor Event Times      Labor onset date/time:        Dilation complete date/time:  23 01:59:00     Start pushing date/time:  2023 01:59:00   Decision date/time (emergent ):            Labor Length    2nd stage: 0h 40m  3rd stage: 0h 04m       Delivery Details      Delivery Date: 23 Delivery Time: 02:39:00   Delivery Type: Vaginal, Spontaneous              [place] Presentation    Presentation: Vertex  Position: Right  _: Occiput  _: Anterior       Shoulder Dystocia    Shoulder Dystocia Present?: No                                                                                                           Assisted Delivery Details    Forceps Attempted?: No  Vacuum Extractor Attempted?: No                                                             Cord    Vessels: 3 Vessels  Complications: None  Delayed Cord Clamping?: Yes  Cord Clamped Date/Time: 2023 02:42:00  Cord Blood Disposition: Lab  Gases Sent?: No              Placenta    Date/Time: 2023 02:43:00  Removal: Spontaneous  Appearance: Intact  Disposition: Discarded       Lacerations    Episiotomy: None  Perineal Lacerations: 2nd  Other Lacerations: no non-perineal laceration  Number of Repair Packets: 1       Vaginal Counts    Initial Count Personnel: Can Linear  Initial Count Verified By: Jesus Mcconnell  Intial Sponge Count: Correct  Intial Instruments Count: Correct   Final Sponges Count: Correct Final Needles  Count: Correct Final Instruments Count: Correct   Final Count

## 2023-09-18 NOTE — FLOWSHEET NOTE
Updated Dr. Rhoda Holland of PT's SVE of a rim and pt feeling like she has to push.  Dr. Rhoda Holland heading to the hospital.

## 2023-09-19 VITALS
RESPIRATION RATE: 17 BRPM | OXYGEN SATURATION: 96 % | SYSTOLIC BLOOD PRESSURE: 115 MMHG | TEMPERATURE: 97.8 F | DIASTOLIC BLOOD PRESSURE: 67 MMHG | HEART RATE: 94 BPM

## 2023-09-19 PROCEDURE — 6370000000 HC RX 637 (ALT 250 FOR IP): Performed by: OBSTETRICS & GYNECOLOGY

## 2023-09-19 PROCEDURE — 51702 INSERT TEMP BLADDER CATH: CPT

## 2023-09-19 PROCEDURE — 7200000001 HC VAGINAL DELIVERY

## 2023-09-19 RX ORDER — IBUPROFEN 800 MG/1
800 TABLET ORAL EVERY 8 HOURS PRN
Qty: 30 TABLET | Refills: 0 | Status: SHIPPED | OUTPATIENT
Start: 2023-09-19

## 2023-09-19 RX ADMIN — Medication: at 08:49

## 2023-09-19 RX ADMIN — ACETAMINOPHEN 650 MG: 325 TABLET ORAL at 08:48

## 2023-09-19 RX ADMIN — IBUPROFEN 800 MG: 800 TABLET, FILM COATED ORAL at 03:18

## 2023-09-19 RX ADMIN — BENZOCAINE AND LEVOMENTHOL: 200; 5 SPRAY TOPICAL at 08:48

## 2023-09-19 ASSESSMENT — PAIN DESCRIPTION - LOCATION
LOCATION: BACK
LOCATION: PERINEUM

## 2023-09-19 ASSESSMENT — PAIN DESCRIPTION - DESCRIPTORS
DESCRIPTORS: SORE
DESCRIPTORS: CRAMPING

## 2023-09-19 ASSESSMENT — PAIN DESCRIPTION - ORIENTATION: ORIENTATION: LOWER

## 2023-09-19 ASSESSMENT — PAIN SCALES - GENERAL
PAINLEVEL_OUTOF10: 3
PAINLEVEL_OUTOF10: 5

## 2023-09-19 NOTE — FLOWSHEET NOTE
Patient educated on use of nipple cooling gels and care instructions at home. Pt also educated to express colostrum on nipples post feed, air dry then apply lanolin if desired. Pt encouraged to call out for breastfeeding assistance if needed. Pt verbalizes understanding of instructions.

## 2023-09-19 NOTE — FLOWSHEET NOTE
Discharge instructions for mom and baby reviewed with patient and FOB at bedside. Patient verbalized understanding of instructions and denied the need for further explanation.

## 2023-09-19 NOTE — DISCHARGE INSTRUCTIONS
Follow-up with your [facility] doctor as specified. [address] [facility] Department phone: [phone]    Dr. Alan Be [address] [facility] [phone]   or Lemuel Sampson [phone]     Dr Sonny Álvarez MD  Dalila Rule  [address] [address]  [phone]    Susy Goldman, MSN, APRN, [address]  [address] [facility]  [phone]     [address] [facility]. [address] [facility], [address] [employer]  [phone]    [facility] [place]  [address], [address] [employer]  [phone]    DIET  Eat a well balanced diet focusing on foods high in fiber and protein. Drink plenty of fluids especially water. To avoid constipation you may take a mild stool softener as recommended by your doctor or midwife. ACTIVITY  Gradually increase your activity. Resume exercise regimen only after advice by your doctor or midwife. Avoid lifting anything heavier than a gallon of milk for SIX weeks. Avoid driving until your doctor or midwife has given their approval.  Marshal Oiler slowly from a lying to sitting and then a standing position. Climb stairs one at a time. Use caution when carrying your baby up and down the stairs. NO SEXUAL Activity for 4-6 weeks or until advised by your doctor; Nothing in vagina: intercourse, tampons, or douching. Be prepared to discuss family planning at your follow-up OB visit. You may feel tired or have a lack of energy. You may continue your prenatal vitamin to replenish nutrients post delivery. Nap when baby naps to catch up on sleep. EMOTIONS  You may feel leal, sad, teary, & overwhelmed. Contact your OB provider if you feel you may be showing signs of postpartum depression, or have thoughts of harming yourself or your infant.   If infant will not stop crying, contact another adult for help or place infant in their crib on their

## 2023-09-19 NOTE — FLOWSHEET NOTE
Patient off unit in stable condition. Departure Mode: with significant other. and in private car    Mobility at Departure: ambulatory    Discharged to: private residence    Time of Discharge: [phone]    Discharge instructions reviewed with patient and FOB. All questions answered and patient verbalized understanding.

## 2023-09-19 NOTE — DISCHARGE SUMMARY
Date: 09/17/2023  Value: <0.03       Ref range: 0.00 - 0.44 k/uL   Status: Final  Basophils Absolute                            Date: 09/17/2023  Value: <0.03       Ref range: 0.00 - 0.20 k/uL   Status: Final  Absolute Immature Granulocyte                 Date: 09/17/2023  Value: 0.05        Ref range: 0.00 - 0.30 k/uL   Status: Final  Blood Bank Sample Expiration                  Date: 09/17/2023  Value: 09/20/2023,2359                       Status: Final  Arm Band Number                               Date: 09/17/2023  Value: [id number]       Status: Final  ABO/Rh                                        Date: 09/17/2023  Value: A POSITIVE    Status: Final  Antibody Screen                               Date: 09/17/2023  Value: NEGATIVE      Status: Final  ------------    Radiology last 7 days:  No results found. [unfilled]    Discharge Medications    Current Discharge Medication List    START taking these medications    ibuprofen (ADVIL;MOTRIN) 800 MG tablet  Take 1 tablet by mouth every 8 hours as needed for Pain  Qty: 30 tablet Refills: 0          Current Discharge Medication List        Current Discharge Medication List    CONTINUE these medications which have NOT CHANGED    Prenatal Vit-Fe Fumarate-FA (PRENATAL VITAMIN PO)  Take by mouth          Current Discharge Medication List        Time Spent on Discharge:  30 minutes were spent in patient examination, evaluation, counseling as well as medication reconciliation, prescriptions for required medications, discharge plan, and follow up.     Electronically signed by Ally Cano DO on 9/19/23 at 7:58 AM EDT

## 2023-09-19 NOTE — LACTATION NOTE
This note was copied from a baby's chart. Lactation note:    [] Feeding observed, see lactation flowsheet. [] Patient states breastfeeding is going well:  no complaints. Denies questions or concerns. [x] Patient has questions/concerns. Right breast still has some pinching/pain with latch. Denies pain on left side. Reviewed bodywork with parents - suggested chiropractic, mom states that she has been to [address] in [place] and will try them. [x] Verbalizes understanding, advised to follow up with lactation consultant if necessary.

## 2023-09-27 ENCOUNTER — TELEPHONE (OUTPATIENT)
Dept: OBGYN CLINIC | Age: 23
End: 2023-09-27

## 2023-11-01 ENCOUNTER — POSTPARTUM VISIT (OUTPATIENT)
Dept: OBGYN CLINIC | Age: 23
End: 2023-11-01

## 2023-11-01 VITALS — SYSTOLIC BLOOD PRESSURE: 104 MMHG | WEIGHT: 220 LBS | BODY MASS INDEX: 33.45 KG/M2 | DIASTOLIC BLOOD PRESSURE: 74 MMHG

## 2023-11-01 DIAGNOSIS — L92.9 GRANULATION TISSUE AT OBSTETRICAL LACERATION SITE: ICD-10-CM

## 2023-11-01 PROCEDURE — 0503F POSTPARTUM CARE VISIT: CPT | Performed by: OBSTETRICS & GYNECOLOGY

## 2023-11-01 NOTE — PROGRESS NOTES
Postpartum Visit: Patient here for postpartum visit. She is 6 weeks post partum following a spontaneous vaginal delivery. I have fully reviewed the prenatal and intrapartum course. The delivery was at 36 w 2d gestational weeks. Outcome: . Anesthesia: Epidural.  Postpartum course has been uncomplicated. Baby's course has been doing well without problems. Baby is feeding breast.  Bleeding no bleeding. Bowel function is normal. Bladder function is normal. Patient is sexually active. Contraception method is none. Postpartum depression screening: positive. Shelby=5  Physical Exam  Constitutional:       Appearance: Normal appearance. Genitourinary:      Genitourinary Comments: Granulation tissue at edge of vaginal lac; tender to palpation      Right Labia: tenderness. HENT:      Head: Normocephalic and atraumatic. Eyes:      Extraocular Movements: Extraocular movements intact. Pupils: Pupils are equal, round, and reactive to light. Cardiovascular:      Rate and Rhythm: Normal rate. Pulmonary:      Effort: Pulmonary effort is normal.   Musculoskeletal:         General: Normal range of motion. Neurological:      Mental Status: She is alert and oriented to person, place, and time. Skin:     General: Skin is warm and dry. Psychiatric:         Mood and Affect: Mood normal.         Behavior: Behavior normal.                               Assessment and Plan          Diagnosis Orders   1. Postpartum care and examination        2. Mother currently breast-feeding        3. Obstetrical laceration, second degree        4. Granulation tissue at obstetrical laceration site            Pt is establishing breast feeding - baby is having a lot of reflux/vomiting. Has intermittent spotting from ob lac still. Needs to return in 6 weeks to get return to work.      I am having Lary Mcgovern maintain her Prenatal Vit-Fe Fumarate-FA (PRENATAL VITAMIN PO), Probiotic Product (PROBIOTIC PO), and MAGNESIUM

## 2023-11-06 ENCOUNTER — TELEPHONE (OUTPATIENT)
Dept: OBGYN CLINIC | Age: 23
End: 2023-11-06

## 2023-11-06 NOTE — TELEPHONE ENCOUNTER
Pregnancy short term disability paperwork filled out and faxed to [facility] with fax confirmation received. Additional 6 weeks off d/t poor tissue healing.

## 2023-11-15 ENCOUNTER — TELEPHONE (OUTPATIENT)
Dept: OBGYN CLINIC | Age: 23
End: 2023-11-15

## 2023-11-15 NOTE — TELEPHONE ENCOUNTER
Pt called stating [facility] never received her STD paperwork. I refaxed to number on the form and received another confirmation of fax received. I called pt and left v/m letting her know and to call if any further issues.

## 2023-11-21 ENCOUNTER — TELEPHONE (OUTPATIENT)
Dept: OBGYN CLINIC | Age: 23
End: 2023-11-21

## 2023-11-21 NOTE — TELEPHONE ENCOUNTER
Received a request for medical records from [address] [place] [address] [facility] for pregnancy. Records faxed to company with confirmation of fax received.

## 2023-12-11 ASSESSMENT — PATIENT HEALTH QUESTIONNAIRE - PHQ9
2. FEELING DOWN, DEPRESSED OR HOPELESS: NOT AT ALL
SUM OF ALL RESPONSES TO PHQ QUESTIONS 1-9: 0
2. FEELING DOWN, DEPRESSED OR HOPELESS: 0
SUM OF ALL RESPONSES TO PHQ9 QUESTIONS 1 & 2: 0
1. LITTLE INTEREST OR PLEASURE IN DOING THINGS: NOT AT ALL
SUM OF ALL RESPONSES TO PHQ QUESTIONS 1-9: 0
SUM OF ALL RESPONSES TO PHQ9 QUESTIONS 1 & 2: 0
1. LITTLE INTEREST OR PLEASURE IN DOING THINGS: 0

## 2023-12-12 NOTE — PROGRESS NOTES
CHIEF COMPLAINT:     Chief Complaint   Patient presents with    Follow-up     Today's appointment was to recheck vaginal tear and clear her to return to work. She had a vaginal delivery on 9-. She had poor healing of a second degree tear and at her PPV was taken off work for 6 more weeks. Patient feels that she is healing up well, no problems. There was a problem with the work paperwork and she ended up just quitting her job which was really what she wanted to do anyway. Tomorrow is the last day that she has insurance through her job. HPI:   Eduin Bee presents today with concerns for delayed postpartum laceration healing    GYNECOLOGIC HISTORY:     Sexually active: No      Birth control method :None  Permanent Sterilization: No      REVIEW OF SYSTEMS:  Review of Systems   Constitutional:  Negative for chills and fever. Genitourinary:  Negative for dysuria, vaginal bleeding and vaginal discharge. PHYSICAL EXAM:  Constitutional:   Blood pressure 94/64, weight 100.7 kg (222 lb), last menstrual period 12/01/2023, currently breastfeeding. Wt Readings from Last 3 Encounters:   12/13/23 100.7 kg (222 lb)   11/01/23 99.8 kg (220 lb)   09/12/23 114.7 kg (252 lb 12.8 oz)       Physical Exam  Constitutional:       Appearance: Normal appearance. Genitourinary:      Vulva normal.   HENT:      Head: Normocephalic and atraumatic. Eyes:      Extraocular Movements: Extraocular movements intact. Pupils: Pupils are equal, round, and reactive to light. Cardiovascular:      Rate and Rhythm: Normal rate. Pulmonary:      Effort: Pulmonary effort is normal.   Abdominal:      General: Abdomen is flat. Palpations: Abdomen is soft. Musculoskeletal:         General: Normal range of motion. Neurological:      Mental Status: She is alert and oriented to person, place, and time. Skin:     General: Skin is warm and dry.    Psychiatric:         Mood and Affect: Mood normal.         Behavior: Behavior

## 2023-12-13 ENCOUNTER — OFFICE VISIT (OUTPATIENT)
Dept: OBGYN CLINIC | Age: 23
End: 2023-12-13
Payer: COMMERCIAL

## 2023-12-13 VITALS — SYSTOLIC BLOOD PRESSURE: 94 MMHG | DIASTOLIC BLOOD PRESSURE: 64 MMHG | WEIGHT: 222 LBS | BODY MASS INDEX: 33.75 KG/M2

## 2023-12-13 DIAGNOSIS — L92.9 GRANULATION TISSUE AT OBSTETRICAL LACERATION SITE: ICD-10-CM

## 2023-12-13 PROCEDURE — 99213 OFFICE O/P EST LOW 20 MIN: CPT | Performed by: OBSTETRICS & GYNECOLOGY

## 2024-04-22 SDOH — ECONOMIC STABILITY: FOOD INSECURITY: WITHIN THE PAST 12 MONTHS, YOU WORRIED THAT YOUR FOOD WOULD RUN OUT BEFORE YOU GOT MONEY TO BUY MORE.: NEVER TRUE

## 2024-04-22 SDOH — ECONOMIC STABILITY: FOOD INSECURITY: WITHIN THE PAST 12 MONTHS, THE FOOD YOU BOUGHT JUST DIDN'T LAST AND YOU DIDN'T HAVE MONEY TO GET MORE.: NEVER TRUE

## 2024-04-22 SDOH — ECONOMIC STABILITY: INCOME INSECURITY: HOW HARD IS IT FOR YOU TO PAY FOR THE VERY BASICS LIKE FOOD, HOUSING, MEDICAL CARE, AND HEATING?: NOT HARD AT ALL

## 2024-04-22 ASSESSMENT — PATIENT HEALTH QUESTIONNAIRE - PHQ9
1. LITTLE INTEREST OR PLEASURE IN DOING THINGS: NOT AT ALL
2. FEELING DOWN, DEPRESSED OR HOPELESS: NOT AT ALL
SUM OF ALL RESPONSES TO PHQ9 QUESTIONS 1 & 2: 0
SUM OF ALL RESPONSES TO PHQ9 QUESTIONS 1 & 2: 0
SUM OF ALL RESPONSES TO PHQ QUESTIONS 1-9: 0
1. LITTLE INTEREST OR PLEASURE IN DOING THINGS: NOT AT ALL
2. FEELING DOWN, DEPRESSED OR HOPELESS: NOT AT ALL

## 2024-04-23 ENCOUNTER — OFFICE VISIT (OUTPATIENT)
Dept: OBGYN CLINIC | Age: 24
End: 2024-04-23
Payer: COMMERCIAL

## 2024-04-23 VITALS
DIASTOLIC BLOOD PRESSURE: 70 MMHG | WEIGHT: 227 LBS | SYSTOLIC BLOOD PRESSURE: 110 MMHG | HEIGHT: 68 IN | BODY MASS INDEX: 34.4 KG/M2

## 2024-04-23 DIAGNOSIS — Z31.69 ENCOUNTER FOR GENERAL COUNSELING AND ADVICE ON PROCREATION: Primary | ICD-10-CM

## 2024-04-23 PROCEDURE — 99213 OFFICE O/P EST LOW 20 MIN: CPT | Performed by: ADVANCED PRACTICE MIDWIFE

## 2024-04-23 ASSESSMENT — ENCOUNTER SYMPTOMS
GASTROINTESTINAL NEGATIVE: 1
RESPIRATORY NEGATIVE: 1

## 2024-04-23 NOTE — PROGRESS NOTES
Disp: , Rfl:     MAGNESIUM PO, Take by mouth (Patient not taking: Reported on 4/23/2024), Disp: , Rfl:     Social History     Substance and Sexual Activity   Sexual Activity Yes    Partners: Male       Chief Complaint   Patient presents with    Other     Pt here to discuss family planning. Pt has been trying for past 7 months to get pregnant. Has been doing ovulation kits and timed intercourse. Cycles are 36-40 days sometimes 28 days.        Physical Exam  Constitutional:       Appearance: Normal appearance. She is obese.   HENT:      Head: Normocephalic.   Eyes:      Pupils: Pupils are equal, round, and reactive to light.   Pulmonary:      Effort: Pulmonary effort is normal.   Musculoskeletal:         General: Normal range of motion.      Cervical back: Normal range of motion.   Neurological:      Mental Status: She is alert and oriented to person, place, and time.   Skin:     General: Skin is warm and dry.   Psychiatric:         Behavior: Behavior normal.   Vitals and nursing note reviewed.         Vital Signs  Blood pressure 110/70, height 1.727 m (5' 8\"), weight 103 kg (227 lb), last menstrual period 03/28/2024, currently breastfeeding.                        Results reviewed today:    Labs from initial fertility work up are in chart and reviewed again today:  10/2021 labs  Insulin Fasting = 31.1   2 hour Insulin = 188.9  Progesterone = 0.22 (anovulatory)  A1C = 4.8  TSH = 2.53  LH = 6.2  FSH = 6.4                            Assessment and Plan          Diagnosis Orders   1. Encounter for general counseling and advice on procreation            Discussed return fasting to update labs - TSH, fasting insulin level, progesterone level  Aware that progesterone needs to be done on day 21 - she plans to have all labs done same day  Discussed that options for medications are slightly limited right now secondary to breastfeeding - no femara or trigger shot with this.  Discussed can consider use of metformin (although she

## 2024-06-18 ENCOUNTER — OFFICE VISIT (OUTPATIENT)
Dept: OBGYN CLINIC | Age: 24
End: 2024-06-18
Payer: COMMERCIAL

## 2024-06-18 ENCOUNTER — HOSPITAL ENCOUNTER (OUTPATIENT)
Age: 24
Setting detail: SPECIMEN
Discharge: HOME | End: 2024-06-18

## 2024-06-18 VITALS
BODY MASS INDEX: 34.56 KG/M2 | WEIGHT: 228 LBS | HEIGHT: 68 IN | DIASTOLIC BLOOD PRESSURE: 80 MMHG | SYSTOLIC BLOOD PRESSURE: 112 MMHG

## 2024-06-18 DIAGNOSIS — Z31.69 ENCOUNTER FOR GENERAL COUNSELING AND ADVICE ON PROCREATION: ICD-10-CM

## 2024-06-18 DIAGNOSIS — Z12.4 SCREENING FOR CERVICAL CANCER: ICD-10-CM

## 2024-06-18 DIAGNOSIS — Z01.419 SMEAR, VAGINAL, AS PART OF ROUTINE GYNECOLOGICAL EXAMINATION: Primary | ICD-10-CM

## 2024-06-18 DIAGNOSIS — Z12.72 SMEAR, VAGINAL, AS PART OF ROUTINE GYNECOLOGICAL EXAMINATION: Primary | ICD-10-CM

## 2024-06-18 PROCEDURE — 99395 PREV VISIT EST AGE 18-39: CPT | Performed by: ADVANCED PRACTICE MIDWIFE

## 2024-06-18 PROCEDURE — 99213 OFFICE O/P EST LOW 20 MIN: CPT | Performed by: ADVANCED PRACTICE MIDWIFE

## 2024-06-18 ASSESSMENT — ENCOUNTER SYMPTOMS
GASTROINTESTINAL NEGATIVE: 1
ABDOMINAL PAIN: 0
DIARRHEA: 0
CONSTIPATION: 0
SHORTNESS OF BREATH: 0
RESPIRATORY NEGATIVE: 1

## 2024-06-18 NOTE — PROGRESS NOTES
YEARLY PHYSICAL    Date of service: 2024    Mya Cavanaugh  Is a 23 y.o.   female    PT's PCP is: Twyla Faust MD     : 2000                                             Subjective:       Patient's last menstrual period was 2024 (exact date).     Are your menses regular: no    OB History    Para Term  AB Living   1 1 1 0 0 1   SAB IAB Ectopic Molar Multiple Live Births   0 0 0 0 0 1      # Outcome Date GA Lbr Jignesh/2nd Weight Sex Delivery Anes PTL Lv   1 Term 23 40w2d / 00:40 3.799 kg (8 lb 6 oz) F Vag-Spont EPI N MARY        Social History     Tobacco Use   Smoking Status Never   Smokeless Tobacco Never        Social History     Substance and Sexual Activity   Alcohol Use Not Currently    Comment: rare       Family History   Problem Relation Age of Onset    Miscarriages / Stillbirths Mother     Hypertension Mother     No Known Problems Father     Asthma Sister     Polycystic Ovary Syndrome Sister     Asthma Brother     No Known Problems Brother     Breast Cancer Maternal Grandmother     Heart Failure Maternal Grandfather     Heart Surgery Maternal Grandfather     Stroke Paternal Grandmother         [place] Grandmother    Lung Cancer Paternal Grandfather        Any family history of breast or ovarian cancer: Yes    Any family history of blood clots: No      Allergies: Pcn [penicillins]      Current Outpatient Medications:     Prenatal Vit-Fe Fumarate-FA (PRENATAL VITAMIN PO), Take by mouth, Disp: , Rfl:     Probiotic Product (PROBIOTIC PO), Take by mouth (Patient not taking: Reported on 2024), Disp: , Rfl:     MAGNESIUM PO, Take by mouth (Patient not taking: Reported on 2024), Disp: , Rfl:     Social History     Substance and Sexual Activity   Sexual Activity Yes    Partners: Male       Any bleeding or pain with intercourse: No    Last Yearly:      Last pap: Due    Last HPV: Due    Do you

## 2024-07-01 LAB — CYTOLOGY REPORT: NORMAL

## 2024-07-18 ENCOUNTER — TELEPHONE (OUTPATIENT)
Dept: OBGYN CLINIC | Age: 24
End: 2024-07-18

## 2024-07-18 DIAGNOSIS — N91.1 AMENORRHEA, SECONDARY: Primary | ICD-10-CM

## 2024-07-18 NOTE — TELEPHONE ENCOUNTER
To use progesterone supp we really should consider a day 21 level to see if ovulatory or not.  Also don't want to use progesterone whole cycle - should be used in second half of cycle.  Also - does she want to try to induce a cycle with provera?  I can do HCG level and if its negative do short duration provera for menses induction.  Per epocrkarlos provera during lactation - risk vs benefit and \"no known risk of infant harm based on limited human data and drug properties\", may have a decrease in milk production with this though.    If she wants to induce menses - HCG level, if negative will send script for Provera 10mg PO daily x 10 days, then should bleed within 7 days and then can start to count for obtaining a day 21 progesterone level and manage further.    However has hx of some irreg cycles and while breastfeeding this is likely not going to improve.  Options for conception and desires to conceive with medications are very limited while nursing

## 2024-07-18 NOTE — TELEPHONE ENCOUNTER
Patient called stating that she is currently day 72 of her cycle.  She had a visit recently with Giselle and discussed starting supplemental progesterone again.  She is still currently breast feeding.  She cannot come in for a day 21 progesterone as she has not started her cycle.  Ok to send in progesterone supp to [facility]?

## 2024-07-18 NOTE — TELEPHONE ENCOUNTER
I spoke to patient and reviewed Giselle's recommendations.  She states that she did have a neg SPT last week at the hospital when she had her gallbladder removed.  Explained that we would need a more recent HCG level before trying to induce a cycle.  She is questioning about waiting until day 85 and if not starting a cycle, she will come in for an HCG and Provera challenge.  Order in the lab for HCG if needed.  Patient verbalized understanding, no further questions/concerns voiced.

## 2024-08-13 ENCOUNTER — HOSPITAL ENCOUNTER (OUTPATIENT)
Age: 24
Setting detail: SPECIMEN
Discharge: HOME | End: 2024-08-13

## 2024-08-13 DIAGNOSIS — N91.1 AMENORRHEA, SECONDARY: ICD-10-CM

## 2024-08-13 LAB — B-HCG SERPL EIA 3RD IS-ACNC: <0.2 MIU/ML (ref 0–7)

## 2024-09-11 ENCOUNTER — HOSPITAL ENCOUNTER (OUTPATIENT)
Age: 24
Setting detail: SPECIMEN
Discharge: HOME | End: 2024-09-11

## 2024-09-11 DIAGNOSIS — Z31.69 ENCOUNTER FOR GENERAL COUNSELING AND ADVICE ON PROCREATION: ICD-10-CM

## 2024-09-11 LAB
INSULIN COMMENT: NORMAL
INSULIN REFERENCE RANGE:: NORMAL
INSULIN: 16.2 MU/L
PROGEST SERPL-MCNC: 4.19 NG/ML
TSH SERPL DL<=0.05 MIU/L-ACNC: 2.56 UIU/ML (ref 0.27–4.2)

## 2024-10-29 ENCOUNTER — TELEPHONE (OUTPATIENT)
Dept: OBGYN CLINIC | Age: 24
End: 2024-10-29

## 2024-10-29 NOTE — TELEPHONE ENCOUNTER
Patient called that she started her cycle today and this is her second regular cycle.  Her cycle is coming every 34 days.  She is questioning if she could be put on progesterone supplement.  She is still breastfeeding and aware that she cannot do certain medications.  LMP 10/29.  Can you please review and give recommendations?

## 2024-10-30 NOTE — TELEPHONE ENCOUNTER
Okay to send Progesterone 100mg qhs vaginal suppositories.  Cycle day 16 through menses or if becomes pregnant then through end of first trimester  Dispense #30 with 3 refills please

## 2024-10-30 NOTE — TELEPHONE ENCOUNTER
Patient called and was informed rx was sent to [facility]. Instructed her to use cycle day 16 until menses or if becomes pregnant continue until end of 1st trimester.

## 2024-12-05 ENCOUNTER — TELEPHONE (OUTPATIENT)
Dept: OBGYN CLINIC | Age: 24
End: 2024-12-05

## 2024-12-05 NOTE — TELEPHONE ENCOUNTER
Patient called stating that she is still comfort nursing her 14 month old daughter, she only nurses to go to sleep and only getting minimal milk output.  She has a cold and only had Dayquil at home, she has been using Tylenol sinus previously, but is out.  She was questioning is she could take the Dayquil while nursing since minimal output and if needed to pump and dump.  Spoke to Giselle Simon and as long as patient does not take the Dayquil right before nursing, she does not need to do anything differently.  Patient will nurse again around 3 pm, so will take a dose now and call if any further questions/concerns.  Patient verbalized understanding.

## 2024-12-23 ENCOUNTER — TELEPHONE (OUTPATIENT)
Dept: OBGYN CLINIC | Age: 24
End: 2024-12-23

## 2024-12-23 NOTE — TELEPHONE ENCOUNTER
Patient called. She is done breastfeeding and said you told her to let us know when finished breastfeeding to restart femara.  LMP was December 1st.  She expects to start a period on Jan. 1st.  Do you need to see her to discuss this again or can she just call cycle day 1 of next cycle for medication?

## 2024-12-23 NOTE — TELEPHONE ENCOUNTER
Does she not want to give her body a couple of months to see if can conceive on own now that done breastfeeding?  If no conception then Femara?  Or just jump right to Femara?  Some women do not need medications again - never know.  If she wants femara then call cycle day 1 of next menses for script and Usn.  Will need meds cycle day 5-9 (with holiday may have to wait until back in office as on call will  NOT send these meds in).  Will need a day 10-12 follicle scan.

## 2025-01-06 ENCOUNTER — TELEPHONE (OUTPATIENT)
Dept: OBGYN CLINIC | Age: 25
End: 2025-01-06

## 2025-01-06 RX ORDER — LETROZOLE 2.5 MG/1
TABLET, FILM COATED ORAL
Qty: 10 TABLET | Refills: 0 | Status: SHIPPED | OUTPATIENT
Start: 2025-01-06

## 2025-01-06 NOTE — TELEPHONE ENCOUNTER
Patient left a message on the voicemail that her LMP was 1/5 and needs a Rx for Femara sent to the pharmacy.  Per patient, last month was called in for day 5-9, but her last successful round was taken days 3-7 and questioning if she could start on day 3 as opposed to day 5?

## 2025-01-14 ENCOUNTER — OFFICE VISIT (OUTPATIENT)
Dept: OBGYN CLINIC | Age: 25
End: 2025-01-14
Payer: COMMERCIAL

## 2025-01-14 VITALS — WEIGHT: 244 LBS | DIASTOLIC BLOOD PRESSURE: 72 MMHG | SYSTOLIC BLOOD PRESSURE: 126 MMHG | BODY MASS INDEX: 37.1 KG/M2

## 2025-01-14 DIAGNOSIS — Z31.49 PROCREATION MANAGEMENT INVESTIGATION AND TESTING: Primary | ICD-10-CM

## 2025-01-14 PROCEDURE — G8427 DOCREV CUR MEDS BY ELIG CLIN: HCPCS | Performed by: ADVANCED PRACTICE MIDWIFE

## 2025-01-14 PROCEDURE — 1036F TOBACCO NON-USER: CPT | Performed by: ADVANCED PRACTICE MIDWIFE

## 2025-01-14 PROCEDURE — G8417 CALC BMI ABV UP PARAM F/U: HCPCS | Performed by: ADVANCED PRACTICE MIDWIFE

## 2025-01-14 PROCEDURE — 99213 OFFICE O/P EST LOW 20 MIN: CPT | Performed by: ADVANCED PRACTICE MIDWIFE

## 2025-01-14 ASSESSMENT — ENCOUNTER SYMPTOMS
GASTROINTESTINAL NEGATIVE: 1
BACK PAIN: 1
RESPIRATORY NEGATIVE: 1

## 2025-01-14 NOTE — PROGRESS NOTES
PROBLEM VISIT     Date of service: 2025    Mya Cavanaugh  Is a 24 y.o.  female    PT's PCP is: Twyla Faust MD     : 2000                                          HPI Here for Usn f/u after Femara dosing.  She and spouse are trying to conceive.  This month took Femara 5mg PO cycle day 3-7. This most recent cycle had more headaches.  Also having some low back pain - has hx of back problems so unsure if related    Review of Systems   Constitutional: Negative.    HENT: Negative.     Respiratory: Negative.     Gastrointestinal: Negative.    Genitourinary: Negative.  Negative for menstrual problem and pelvic pain.   Musculoskeletal:  Positive for back pain.   Neurological:  Positive for headaches. Negative for dizziness.   Psychiatric/Behavioral: Negative.         No LMP recorded.   OB History    Para Term  AB Living   1 1 1 0 0 1   SAB IAB Ectopic Molar Multiple Live Births   0 0 0 0 0 1      # Outcome Date GA Lbr Jignesh/2nd Weight Sex Type Anes PTL Lv   1 Term 23 40w2d / 00:40 3.799 kg (8 lb 6 oz) F Vag-Spont EPI N MARY        Social History     Tobacco Use   Smoking Status Never   Smokeless Tobacco Never        Social History     Substance and Sexual Activity   Alcohol Use Not Currently    Comment: rare       Allergies: Pcn [penicillins]      Current Outpatient Medications:     letrozole (FEMARA) 2.5 MG tablet, Take 2 tablets daily cycle day 3-7, Disp: 10 tablet, Rfl: 0    NONFORMULARY, Progesterone 100mg vaginal supp. Use cycle day 16 until menses or if becomes pregnant until end of first trimester., Disp: , Rfl:     medroxyPROGESTERone (PROVERA) 10 MG tablet, Take 1 tablet by mouth daily for 10 days, Disp: 10 tablet, Rfl: 0    Probiotic Product (PROBIOTIC PO), Take by mouth (Patient not taking: Reported on 2024), Disp: , Rfl:     MAGNESIUM PO, Take by mouth (Patient not taking: Reported on 2024), Disp: , Rfl:     Prenatal Vit-Fe Fumarate-FA (PRENATAL VITAMIN

## 2025-02-03 ENCOUNTER — TELEPHONE (OUTPATIENT)
Dept: OBGYN CLINIC | Age: 25
End: 2025-02-03

## 2025-02-03 ENCOUNTER — HOSPITAL ENCOUNTER (OUTPATIENT)
Age: 25
Setting detail: SPECIMEN
Discharge: HOME | End: 2025-02-03

## 2025-02-03 DIAGNOSIS — N91.1 AMENORRHEA, SECONDARY: ICD-10-CM

## 2025-02-03 DIAGNOSIS — N91.1 AMENORRHEA, SECONDARY: Primary | ICD-10-CM

## 2025-02-03 LAB — B-HCG SERPL EIA 3RD IS-ACNC: 172 MIU/ML

## 2025-02-03 NOTE — TELEPHONE ENCOUNTER
Spoke to patient and reviewed Giselle's recommendations, patient verbalized understanding, no further questions/concerns voiced.

## 2025-02-03 NOTE — TELEPHONE ENCOUNTER
Patient left a message on the voicemail that she is wanting to start a probiotic.  Patient is questioning if there is one you would recommend?

## 2025-02-05 ENCOUNTER — HOSPITAL ENCOUNTER (OUTPATIENT)
Age: 25
Setting detail: SPECIMEN
Discharge: HOME | End: 2025-02-05

## 2025-02-05 ENCOUNTER — TELEPHONE (OUTPATIENT)
Dept: OBGYN CLINIC | Age: 25
End: 2025-02-05

## 2025-02-05 DIAGNOSIS — N91.1 AMENORRHEA, SECONDARY: ICD-10-CM

## 2025-02-05 DIAGNOSIS — N91.1 AMENORRHEA, SECONDARY: Primary | ICD-10-CM

## 2025-02-05 LAB — B-HCG SERPL EIA 3RD IS-ACNC: 310 MIU/ML

## 2025-02-05 NOTE — TELEPHONE ENCOUNTER
Patient called stating that her HCG levels just resulted in MyChart and she wanted to discuss her results.  Her result did increase by greater than 60%, but Giselle has not reviewed her results.  Reassured patient, aware that Giselle will review them tomorrow when she is back in the office and we will kuldip her if there are any additional recommendations.  Patient verbalized understanding, no further questions/concerns voiced.

## 2025-02-06 NOTE — TELEPHONE ENCOUNTER
Reviewed and sent to portal to call patient - they look good - viability around 7-8 weks gestation is appropriate

## 2025-02-28 NOTE — PROGRESS NOTES
PROBLEM VISIT     Date of service: 3/3/2025    Mya Cavanaugh  Is a 24 y.o.  female    PT's PCP is: Twyla Faust MD     : 2000                                          HPI Here for viability Usn follow up.  Planned and desired pregnancy - used femara to help conceive.  Continues vaginal supp.  Taking PNV.  Denies vaginal bleeding/spotting.  Has noted some first trimester s/s - fatigue, nausea, emesis of bile every morning and increased gagging.  First pregnancy uncomplicated.     Review of Systems   Constitutional:  Positive for fatigue.   HENT: Negative.     Respiratory: Negative.     Cardiovascular: Negative.    Gastrointestinal:  Positive for nausea and vomiting.   Genitourinary:  Positive for menstrual problem (amenorrhea related to pregnancy). Negative for pelvic pain and vaginal bleeding.   Musculoskeletal: Negative.    Neurological: Negative.    Psychiatric/Behavioral: Negative.         No LMP recorded.   OB History    Para Term  AB Living   1 1 1 0 0 1   SAB IAB Ectopic Molar Multiple Live Births   0 0 0 0 0 1      # Outcome Date GA Lbr Jignesh/2nd Weight Sex Type Anes PTL Lv   1 Term 23 40w2d / 00:40 3.799 kg (8 lb 6 oz) F Vag-Spont EPI N MARY        Social History     Tobacco Use   Smoking Status Never   Smokeless Tobacco Never        Social History     Substance and Sexual Activity   Alcohol Use Not Currently    Comment: rare       Allergies: Pcn [penicillins]      Current Outpatient Medications:     letrozole (FEMARA) 2.5 MG tablet, Take 2 tablets daily cycle day 3-7, Disp: 10 tablet, Rfl: 0    NONFORMULARY, Progesterone 100mg vaginal supp. Use cycle day 16 until menses or if becomes pregnant until end of first trimester., Disp: , Rfl:     medroxyPROGESTERone (PROVERA) 10 MG tablet, Take 1 tablet by mouth daily for 10 days, Disp: 10 tablet, Rfl: 0    Prenatal Vit-Fe Fumarate-FA (PRENATAL VITAMIN PO), Take by mouth, Disp: , Rfl:     Social History     Substance and

## 2025-03-03 ENCOUNTER — OFFICE VISIT (OUTPATIENT)
Dept: OBGYN CLINIC | Age: 25
End: 2025-03-03
Payer: COMMERCIAL

## 2025-03-03 VITALS
HEIGHT: 68 IN | BODY MASS INDEX: 37.13 KG/M2 | DIASTOLIC BLOOD PRESSURE: 72 MMHG | SYSTOLIC BLOOD PRESSURE: 102 MMHG | WEIGHT: 245 LBS

## 2025-03-03 DIAGNOSIS — N91.1 AMENORRHEA, SECONDARY: Primary | ICD-10-CM

## 2025-03-03 PROCEDURE — G8417 CALC BMI ABV UP PARAM F/U: HCPCS | Performed by: ADVANCED PRACTICE MIDWIFE

## 2025-03-03 PROCEDURE — G8427 DOCREV CUR MEDS BY ELIG CLIN: HCPCS | Performed by: ADVANCED PRACTICE MIDWIFE

## 2025-03-03 PROCEDURE — 99213 OFFICE O/P EST LOW 20 MIN: CPT | Performed by: ADVANCED PRACTICE MIDWIFE

## 2025-03-03 PROCEDURE — 1036F TOBACCO NON-USER: CPT | Performed by: ADVANCED PRACTICE MIDWIFE

## 2025-03-03 ASSESSMENT — ENCOUNTER SYMPTOMS
VOMITING: 1
RESPIRATORY NEGATIVE: 1
NAUSEA: 1

## 2025-03-17 ENCOUNTER — HOSPITAL ENCOUNTER (OUTPATIENT)
Age: 25
Setting detail: SPECIMEN
Discharge: HOME | End: 2025-03-17

## 2025-03-17 ENCOUNTER — INITIAL PRENATAL (OUTPATIENT)
Dept: OBGYN CLINIC | Age: 25
End: 2025-03-17

## 2025-03-17 VITALS — BODY MASS INDEX: 36.95 KG/M2 | WEIGHT: 243 LBS | SYSTOLIC BLOOD PRESSURE: 108 MMHG | DIASTOLIC BLOOD PRESSURE: 70 MMHG

## 2025-03-17 DIAGNOSIS — O99.210 OBESITY IN PREGNANCY: ICD-10-CM

## 2025-03-17 DIAGNOSIS — Z34.81 PRENATAL CARE, SUBSEQUENT PREGNANCY, FIRST TRIMESTER: Primary | ICD-10-CM

## 2025-03-17 DIAGNOSIS — Z34.81 PRENATAL CARE, SUBSEQUENT PREGNANCY, FIRST TRIMESTER: ICD-10-CM

## 2025-03-17 LAB
ABO + RH BLD: NORMAL
AMOUNT GLUCOSE GIVEN: 50 G
AMPHET UR QL SCN: NEGATIVE
BACTERIA URNS QL MICRO: ABNORMAL
BARBITURATES UR QL SCN: NEGATIVE
BASOPHILS # BLD: 0.03 K/UL (ref 0–0.2)
BASOPHILS NFR BLD: 0 % (ref 0–2)
BENZODIAZ UR QL: NEGATIVE
BILIRUB UR QL STRIP: NEGATIVE
BLOOD GROUP ANTIBODIES SERPL: NEGATIVE
CANNABINOIDS UR QL SCN: NEGATIVE
CASTS #/AREA URNS LPF: ABNORMAL /LPF (ref 0–8)
CLARITY UR: ABNORMAL
COCAINE UR QL SCN: NEGATIVE
COLOR UR: YELLOW
EOSINOPHIL # BLD: 0.22 K/UL (ref 0–0.44)
EOSINOPHILS RELATIVE PERCENT: 2 % (ref 1–4)
EPI CELLS #/AREA URNS HPF: ABNORMAL /HPF (ref 0–5)
ERYTHROCYTE [DISTWIDTH] IN BLOOD BY AUTOMATED COUNT: 12.3 % (ref 11.8–14.4)
FENTANYL UR QL: NEGATIVE
GLUCOSE 3H P 100 G GLC PO SERPL-MCNC: 151 MG/DL
GLUCOSE UR STRIP-MCNC: NEGATIVE MG/DL
HBV SURFACE AG SERPL QL IA: NONREACTIVE
HCT VFR BLD AUTO: 37.5 % (ref 36.3–47.1)
HCV AB SERPL QL IA: NONREACTIVE
HGB BLD-MCNC: 12.7 G/DL (ref 11.9–15.1)
HGB UR QL STRIP.AUTO: NEGATIVE
HIV 1+2 AB+HIV1 P24 AG SERPL QL IA: NONREACTIVE
IMM GRANULOCYTES # BLD AUTO: 0.04 K/UL (ref 0–0.3)
IMM GRANULOCYTES NFR BLD: 0 %
KETONES UR STRIP-MCNC: NEGATIVE MG/DL
LEUKOCYTE ESTERASE UR QL STRIP: NEGATIVE
LYMPHOCYTES NFR BLD: 2.57 K/UL (ref 1.1–3.7)
LYMPHOCYTES RELATIVE PERCENT: 19 % (ref 24–43)
MCH RBC QN AUTO: 29.6 PG (ref 25.2–33.5)
MCHC RBC AUTO-ENTMCNC: 33.9 G/DL (ref 28.4–34.8)
MCV RBC AUTO: 87.4 FL (ref 82.6–102.9)
METHADONE UR QL: NEGATIVE
MONOCYTES NFR BLD: 0.5 K/UL (ref 0.1–1.2)
MONOCYTES NFR BLD: 4 % (ref 3–12)
NEUTROPHILS NFR BLD: 75 % (ref 36–65)
NEUTS SEG NFR BLD: 10.09 K/UL (ref 1.5–8.1)
NITRITE UR QL STRIP: NEGATIVE
NRBC BLD-RTO: 0 PER 100 WBC
OPIATES UR QL SCN: NEGATIVE
OXYCODONE UR QL SCN: NEGATIVE
PCP UR QL SCN: NEGATIVE
PH UR STRIP: 6.5 [PH] (ref 5–8)
PLATELET # BLD AUTO: 280 K/UL (ref 138–453)
PMV BLD AUTO: 10.9 FL (ref 8.1–13.5)
PROT UR STRIP-MCNC: NEGATIVE MG/DL
RBC # BLD AUTO: 4.29 M/UL (ref 3.95–5.11)
RBC #/AREA URNS HPF: ABNORMAL /HPF (ref 0–4)
RUBV IGG SERPL QL IA: 13.3 IU/ML
SP GR UR STRIP: 1.02 (ref 1–1.03)
T PALLIDUM AB SER QL IA: NONREACTIVE
TEST INFORMATION: NORMAL
UROBILINOGEN UR STRIP-ACNC: NORMAL EU/DL (ref 0–1)
WBC #/AREA URNS HPF: ABNORMAL /HPF (ref 0–5)
WBC OTHER # BLD: 13.5 K/UL (ref 3.5–11.3)

## 2025-03-17 PROCEDURE — 0500F INITIAL PRENATAL CARE VISIT: CPT | Performed by: ADVANCED PRACTICE MIDWIFE

## 2025-03-17 SDOH — ECONOMIC STABILITY: FOOD INSECURITY: WITHIN THE PAST 12 MONTHS, YOU WORRIED THAT YOUR FOOD WOULD RUN OUT BEFORE YOU GOT MONEY TO BUY MORE.: NEVER TRUE

## 2025-03-17 SDOH — ECONOMIC STABILITY: FOOD INSECURITY: WITHIN THE PAST 12 MONTHS, THE FOOD YOU BOUGHT JUST DIDN'T LAST AND YOU DIDN'T HAVE MONEY TO GET MORE.: NEVER TRUE

## 2025-03-17 NOTE — PROGRESS NOTES
Patient here today for PNI. She brought her mother with her. Patient declines optional testing.  Verbal consent obtained for urine drug screen. 1 hr gtt done today with labs.

## 2025-03-18 ENCOUNTER — RESULTS FOLLOW-UP (OUTPATIENT)
Dept: OBGYN CLINIC | Age: 25
End: 2025-03-18

## 2025-03-18 DIAGNOSIS — O99.810 ABNORMAL GLUCOSE TOLERANCE IN PREGNANCY: Primary | ICD-10-CM

## 2025-03-18 LAB
CHLAMYDIA DNA UR QL NAA+PROBE: NEGATIVE
MICROORGANISM SPEC CULT: NORMAL
N GONORRHOEA DNA UR QL NAA+PROBE: NEGATIVE
SERVICE CMNT-IMP: NORMAL
SPECIMEN DESCRIPTION: NORMAL
SPECIMEN DESCRIPTION: NORMAL

## 2025-03-20 ENCOUNTER — RESULTS FOLLOW-UP (OUTPATIENT)
Dept: OBGYN CLINIC | Age: 25
End: 2025-03-20

## 2025-03-20 ENCOUNTER — HOSPITAL ENCOUNTER (OUTPATIENT)
Age: 25
Setting detail: SPECIMEN
Discharge: HOME | End: 2025-03-20

## 2025-03-20 ENCOUNTER — TELEPHONE (OUTPATIENT)
Dept: OBGYN CLINIC | Age: 25
End: 2025-03-20

## 2025-03-20 DIAGNOSIS — O99.810 ABNORMAL GLUCOSE TOLERANCE IN PREGNANCY: ICD-10-CM

## 2025-03-20 LAB
AMOUNT GLUCOSE GIVEN: 50 G
GLUCOSE 2H P 100 G GLC PO SERPL-MCNC: 75 MG/DL
GLUCOSE 3H P 100 G GLC PO SERPL-MCNC: 127 MG/DL
GLUCOSE TOLERANCE TEST 2 HOUR: 137 MG/DL
GLUCOSE TOLERANCE TEST 3 HOUR: 71 MG/DL

## 2025-03-20 NOTE — TELEPHONE ENCOUNTER
Patient called for 3 hr gtt results. I informed her that B.R. reviewed and it is normal. She will need to repeat 26-28 wks

## 2025-04-01 ENCOUNTER — INITIAL PRENATAL (OUTPATIENT)
Dept: OBGYN CLINIC | Age: 25
End: 2025-04-01

## 2025-04-01 VITALS — BODY MASS INDEX: 36.49 KG/M2 | DIASTOLIC BLOOD PRESSURE: 70 MMHG | SYSTOLIC BLOOD PRESSURE: 110 MMHG | WEIGHT: 240 LBS

## 2025-04-01 DIAGNOSIS — Z3A.12 12 WEEKS GESTATION OF PREGNANCY: ICD-10-CM

## 2025-04-01 DIAGNOSIS — Z34.81 PRENATAL CARE, SUBSEQUENT PREGNANCY, FIRST TRIMESTER: Primary | ICD-10-CM

## 2025-04-01 PROCEDURE — 0501F PRENATAL FLOW SHEET: CPT | Performed by: ADVANCED PRACTICE MIDWIFE

## 2025-04-01 NOTE — PROGRESS NOTES
Mya Cavanaugh is a 24 y.o. female 12w2d      The patient was seen and evaluated. Fetal movement was Absent .  No contractions or leakage of fluid. Signs and symptoms of  labor as well as labor were reviewed.  Genetic testing was not ordered and reviewed.  MSAFP was not ordered for a 15-20 week gestational age window.  Reviewed. Dates were reviewed with the patient.  An 18-22 week anatomy ultrasound has been ordered.  The patient will return to the office for her next visit in 4 weeks. See antepartum flow sheet.     Assessment:  1. Mya Cavanaugh is a 24 y.o. female  2.   3. 12w2d    Patient Active Problem List    Diagnosis Date Noted    40 weeks gestation of pregnancy 2023    Uterine contractions during pregnancy 2023    39 weeks gestation of pregnancy 2023    Abdominal cramping affecting pregnancy, antepartum 2023    Abdominal cramping 2023    Abdominal pain 2023        Diagnosis Orders   1. Prenatal care, subsequent pregnancy, first trimester        2. 12 weeks gestation of pregnancy              Plan:  Return in about 4 weeks (around 2025) for OB Follow Up.  There are no Patient Instructions on file for this visit.  Continue progesterone through end of 12 weeks gestation  Start bASA daily due to increase risk of pre-ecl

## 2025-04-12 ENCOUNTER — HOSPITAL ENCOUNTER (EMERGENCY)
Age: 25
Discharge: HOME | End: 2025-04-12
Attending: EMERGENCY MEDICINE
Payer: COMMERCIAL

## 2025-04-12 VITALS
WEIGHT: 240 LBS | BODY MASS INDEX: 36.37 KG/M2 | HEIGHT: 68 IN | TEMPERATURE: 97.6 F | HEART RATE: 85 BPM | OXYGEN SATURATION: 98 % | SYSTOLIC BLOOD PRESSURE: 122 MMHG | DIASTOLIC BLOOD PRESSURE: 80 MMHG | RESPIRATION RATE: 18 BRPM

## 2025-04-12 DIAGNOSIS — E86.0 DEHYDRATION: ICD-10-CM

## 2025-04-12 DIAGNOSIS — R11.2 NAUSEA AND VOMITING, UNSPECIFIED VOMITING TYPE: Primary | ICD-10-CM

## 2025-04-12 DIAGNOSIS — R19.7 DIARRHEA, UNSPECIFIED TYPE: ICD-10-CM

## 2025-04-12 LAB
ALBUMIN SERPL-MCNC: 4 G/DL (ref 3.5–5.2)
ALBUMIN/GLOB SERPL: 1.1 {RATIO} (ref 1–2.5)
ALP SERPL-CCNC: 73 U/L (ref 35–104)
ALT SERPL-CCNC: 16 U/L (ref 10–35)
ANION GAP SERPL CALCULATED.3IONS-SCNC: 13 MMOL/L (ref 9–16)
AST SERPL-CCNC: 17 U/L (ref 10–35)
BACTERIA URNS QL MICRO: ABNORMAL
BASOPHILS # BLD: <0.03 K/UL (ref 0–0.2)
BASOPHILS NFR BLD: 0 % (ref 0–2)
BILIRUB DIRECT SERPL-MCNC: <0.2 MG/DL (ref 0–0.3)
BILIRUB INDIRECT SERPL-MCNC: NORMAL MG/DL (ref 0–1)
BILIRUB SERPL-MCNC: 0.3 MG/DL (ref 0–1.2)
BILIRUB UR QL STRIP: NEGATIVE
BUN SERPL-MCNC: 7 MG/DL (ref 6–20)
BUN/CREAT SERPL: 14 (ref 9–20)
C DIFF GDH + TOXINS A+B STL QL IA.RAPID: NEGATIVE
CALCIUM SERPL-MCNC: 9.4 MG/DL (ref 8.6–10.4)
CHLORIDE SERPL-SCNC: 104 MMOL/L (ref 98–107)
CLARITY UR: CLEAR
CO2 SERPL-SCNC: 20 MMOL/L (ref 20–31)
COLOR UR: YELLOW
CREAT SERPL-MCNC: 0.5 MG/DL (ref 0.5–0.9)
DATE, STOOL #1: ABNORMAL
EOSINOPHIL # BLD: 0.1 K/UL (ref 0–0.44)
EOSINOPHILS RELATIVE PERCENT: 1 % (ref 1–4)
EPI CELLS #/AREA URNS HPF: ABNORMAL /HPF (ref 0–25)
ERYTHROCYTE [DISTWIDTH] IN BLOOD BY AUTOMATED COUNT: 12.2 % (ref 11.8–14.4)
GFR, ESTIMATED: >90 ML/MIN/1.73M2
GLUCOSE SERPL-MCNC: 85 MG/DL (ref 74–99)
GLUCOSE UR STRIP-MCNC: NEGATIVE MG/DL
HCT VFR BLD AUTO: 41.6 % (ref 36.3–47.1)
HEMOCCULT SP1 STL QL: POSITIVE
HGB BLD-MCNC: 14.7 G/DL (ref 11.9–15.1)
HGB UR QL STRIP.AUTO: NEGATIVE
IMM GRANULOCYTES # BLD AUTO: 0.04 K/UL (ref 0–0.3)
IMM GRANULOCYTES NFR BLD: 0 %
KETONES UR STRIP-MCNC: ABNORMAL MG/DL
LEUKOCYTE ESTERASE UR QL STRIP: NEGATIVE
LIPASE SERPL-CCNC: 36 U/L (ref 13–60)
LYMPHOCYTES NFR BLD: 0.93 K/UL (ref 1.1–3.7)
LYMPHOCYTES RELATIVE PERCENT: 8 % (ref 24–43)
MAGNESIUM SERPL-MCNC: 1.9 MG/DL (ref 1.6–2.6)
MCH RBC QN AUTO: 30.4 PG (ref 25.2–33.5)
MCHC RBC AUTO-ENTMCNC: 35.3 G/DL (ref 28.4–34.8)
MCV RBC AUTO: 86.1 FL (ref 82.6–102.9)
MONOCYTES NFR BLD: 0.38 K/UL (ref 0.1–1.2)
MONOCYTES NFR BLD: 3 % (ref 3–12)
MUCOUS THREADS URNS QL MICRO: ABNORMAL
NEUTROPHILS NFR BLD: 88 % (ref 36–65)
NEUTS SEG NFR BLD: 10.9 K/UL (ref 1.5–8.1)
NITRITE UR QL STRIP: NEGATIVE
NRBC BLD-RTO: 0 PER 100 WBC
PH UR STRIP: 6 [PH] (ref 5–9)
PLATELET # BLD AUTO: 253 K/UL (ref 138–453)
PMV BLD AUTO: 10.7 FL (ref 8.1–13.5)
POTASSIUM SERPL-SCNC: 3.9 MMOL/L (ref 3.7–5.3)
PROT SERPL-MCNC: 7.6 G/DL (ref 6.6–8.7)
PROT UR STRIP-MCNC: NEGATIVE MG/DL
RBC # BLD AUTO: 4.83 M/UL (ref 3.95–5.11)
RBC #/AREA URNS HPF: ABNORMAL /HPF (ref 0–2)
SODIUM SERPL-SCNC: 137 MMOL/L (ref 136–145)
SP GR UR STRIP: >1.03 (ref 1.01–1.02)
SPECIMEN DESCRIPTION: NORMAL
TIME, STOOL #1: 1322
UROBILINOGEN UR STRIP-ACNC: NORMAL EU/DL (ref 0–1)
WBC #/AREA URNS HPF: ABNORMAL /HPF (ref 0–5)
WBC OTHER # BLD: 12.4 K/UL (ref 3.5–11.3)

## 2025-04-12 PROCEDURE — 96361 HYDRATE IV INFUSION ADD-ON: CPT

## 2025-04-12 PROCEDURE — 2580000003 HC RX 258: Performed by: EMERGENCY MEDICINE

## 2025-04-12 PROCEDURE — 87449 NOS EACH ORGANISM AG IA: CPT

## 2025-04-12 PROCEDURE — 87506 IADNA-DNA/RNA PROBE TQ 6-11: CPT

## 2025-04-12 PROCEDURE — 6360000002 HC RX W HCPCS: Performed by: EMERGENCY MEDICINE

## 2025-04-12 PROCEDURE — 87324 CLOSTRIDIUM AG IA: CPT

## 2025-04-12 PROCEDURE — 80076 HEPATIC FUNCTION PANEL: CPT

## 2025-04-12 PROCEDURE — 83735 ASSAY OF MAGNESIUM: CPT

## 2025-04-12 PROCEDURE — 85025 COMPLETE CBC W/AUTO DIFF WBC: CPT

## 2025-04-12 PROCEDURE — 99284 EMERGENCY DEPT VISIT MOD MDM: CPT

## 2025-04-12 PROCEDURE — 80048 BASIC METABOLIC PNL TOTAL CA: CPT

## 2025-04-12 PROCEDURE — 96374 THER/PROPH/DIAG INJ IV PUSH: CPT

## 2025-04-12 PROCEDURE — 81001 URINALYSIS AUTO W/SCOPE: CPT

## 2025-04-12 PROCEDURE — 83690 ASSAY OF LIPASE: CPT

## 2025-04-12 PROCEDURE — 82270 OCCULT BLOOD FECES: CPT

## 2025-04-12 RX ORDER — 0.9 % SODIUM CHLORIDE 0.9 %
1000 INTRAVENOUS SOLUTION INTRAVENOUS ONCE
Status: COMPLETED | OUTPATIENT
Start: 2025-04-12 | End: 2025-04-12

## 2025-04-12 RX ORDER — ONDANSETRON 2 MG/ML
INJECTION INTRAMUSCULAR; INTRAVENOUS
Status: DISCONTINUED
Start: 2025-04-12 | End: 2025-04-12 | Stop reason: HOSPADM

## 2025-04-12 RX ORDER — ONDANSETRON 2 MG/ML
4 INJECTION INTRAMUSCULAR; INTRAVENOUS ONCE
Status: COMPLETED | OUTPATIENT
Start: 2025-04-12 | End: 2025-04-12

## 2025-04-12 RX ORDER — ONDANSETRON 4 MG/1
4 TABLET, ORALLY DISINTEGRATING ORAL 3 TIMES DAILY PRN
Qty: 21 TABLET | Refills: 0 | Status: SHIPPED | OUTPATIENT
Start: 2025-04-12

## 2025-04-12 RX ADMIN — SODIUM CHLORIDE 1000 ML: 0.9 INJECTION, SOLUTION INTRAVENOUS at 12:44

## 2025-04-12 RX ADMIN — ONDANSETRON 4 MG: 2 INJECTION, SOLUTION INTRAMUSCULAR; INTRAVENOUS at 15:35

## 2025-04-12 RX ADMIN — SODIUM CHLORIDE 1000 ML: 9 INJECTION, SOLUTION INTRAVENOUS at 14:15

## 2025-04-12 ASSESSMENT — PAIN DESCRIPTION - LOCATION: LOCATION: ABDOMEN

## 2025-04-12 ASSESSMENT — LIFESTYLE VARIABLES
HOW MANY STANDARD DRINKS CONTAINING ALCOHOL DO YOU HAVE ON A TYPICAL DAY: PATIENT DOES NOT DRINK
HOW OFTEN DO YOU HAVE A DRINK CONTAINING ALCOHOL: NEVER

## 2025-04-12 ASSESSMENT — PAIN DESCRIPTION - PAIN TYPE: TYPE: ACUTE PAIN

## 2025-04-12 ASSESSMENT — PAIN DESCRIPTION - FREQUENCY: FREQUENCY: INTERMITTENT

## 2025-04-12 ASSESSMENT — PAIN SCALES - GENERAL
PAINLEVEL_OUTOF10: 3
PAINLEVEL_OUTOF10: 5

## 2025-04-12 ASSESSMENT — PAIN DESCRIPTION - ONSET: ONSET: SUDDEN

## 2025-04-12 ASSESSMENT — PAIN DESCRIPTION - DESCRIPTORS: DESCRIPTORS: CRAMPING

## 2025-04-12 ASSESSMENT — PAIN - FUNCTIONAL ASSESSMENT: PAIN_FUNCTIONAL_ASSESSMENT: 0-10

## 2025-04-12 NOTE — ED PROVIDER NOTES
Abnormal; Notable for the following components:    Occult Blood, Stool #1 POSITIVE (*)     All other components within normal limits   MICROSCOPIC URINALYSIS - Abnormal; Notable for the following components:    Bacteria, UA 1+ (*)     Mucus, UA 2+ (*)     All other components within normal limits   C DIFF TOXIN/ANTIGEN   GASTROINTESTINAL PANEL, MOLECULAR   BASIC METABOLIC PANEL   HEPATIC FUNCTION PANEL   LIPASE   MAGNESIUM       Vitals Reviewed:    Vitals:    04/12/25 1158 04/12/25 1421   BP: 114/74 122/80   Pulse: 99 85   Resp: 20 18   Temp: 97.6 °F (36.4 °C)    TempSrc: Oral    SpO2: 99% 98%   Weight: 108.9 kg (240 lb)    Height: 1.727 m (5' 8\")      MEDICATIONS GIVEN TO PATIENT THIS ENCOUNTER:  Orders Placed This Encounter   Medications    sodium chloride 0.9 % bolus 1,000 mL    sodium chloride 0.9 % bolus 1,000 mL    ondansetron (ZOFRAN-ODT) 4 MG disintegrating tablet     Sig: Take 1 tablet by mouth 3 times daily as needed for Nausea or Vomiting     Dispense:  21 tablet     Refill:  0     DISCHARGE PRESCRIPTIONS:  New Prescriptions    ONDANSETRON (ZOFRAN-ODT) 4 MG DISINTEGRATING TABLET    Take 1 tablet by mouth 3 times daily as needed for Nausea or Vomiting     PHYSICIAN CONSULTS ORDERED THIS ENCOUNTER:  None  FINAL IMPRESSION      1. Nausea and vomiting, unspecified vomiting type    2. Diarrhea, unspecified type    3. Dehydration          DISPOSITION/PLAN   DISPOSITION Decision To Discharge 04/12/2025 03:11:52 PM   DISPOSITION CONDITION Stable           OUTPATIENT FOLLOW UP THE PATIENT:  Twyla Faust MD  [address] [place] [id number]  [phone]    Schedule an appointment as soon as possible for a visit in 2 days        Arsen Farrar MD        This note was created with the assistance of a speech-recognition program. While intending to generate a document that actually reflects the content of the visit, no guarantees can be provided that every mistake has been identified and  corrected by editing.        Arsen Farrar MD  04/12/25 [id number]

## 2025-04-13 LAB
CAMPYLOBACTER DNA SPEC NAA+PROBE: NORMAL
ETEC ELTA+ESTB GENES STL QL NAA+PROBE: NORMAL
P SHIGELLOIDES DNA STL QL NAA+PROBE: NORMAL
SALMONELLA DNA SPEC QL NAA+PROBE: NORMAL
SHIGA TOXIN STX GENE SPEC NAA+PROBE: NORMAL
SHIGELLA DNA SPEC QL NAA+PROBE: NORMAL
SPECIMEN DESCRIPTION: NORMAL
V CHOL+PARA RFBL+TRKH+TNAA STL QL NAA+PR: NORMAL
Y ENTERO RECN STL QL NAA+PROBE: NORMAL

## 2025-04-27 NOTE — PROGRESS NOTES
Mya Cavanaugh is a 24 y.o. female 15w6d      The patient was seen and evaluated. Fetal movement was Present.  No contractions or leakage of fluid. Signs and symptoms of  labor as well as labor were reviewed.  Genetic testing was not ordered and reviewed.  MSAFP was not ordered for a 15-20 week gestational age window.  Reviewed. Dates were reviewed with the patient.  An 18-22 week anatomy ultrasound has been ordered.  The patient will return to the office for her next visit in 4 weeks. See antepartum flow sheet.       Assessment:  1. Mya Cavanaugh is a 24 y.o. female  2.   3. 15w6d    Patient Active Problem List    Diagnosis Date Noted    40 weeks gestation of pregnancy 2023    Uterine contractions during pregnancy 2023    39 weeks gestation of pregnancy 2023    Abdominal cramping affecting pregnancy, antepartum 2023    Abdominal cramping 2023    Abdominal pain 2023        Diagnosis Orders   1. Prenatal care, subsequent pregnancy, second trimester        2. 16 weeks gestation of pregnancy              Plan:  No follow-ups on file.  There are no Patient Instructions on file for this visit.

## 2025-04-29 ENCOUNTER — ROUTINE PRENATAL (OUTPATIENT)
Dept: OBGYN CLINIC | Age: 25
End: 2025-04-29

## 2025-04-29 VITALS — WEIGHT: 238 LBS | BODY MASS INDEX: 36.19 KG/M2 | SYSTOLIC BLOOD PRESSURE: 110 MMHG | DIASTOLIC BLOOD PRESSURE: 60 MMHG

## 2025-04-29 DIAGNOSIS — Z3A.16 16 WEEKS GESTATION OF PREGNANCY: ICD-10-CM

## 2025-04-29 DIAGNOSIS — Z34.82 PRENATAL CARE, SUBSEQUENT PREGNANCY, SECOND TRIMESTER: Primary | ICD-10-CM

## 2025-04-29 PROCEDURE — 0502F SUBSEQUENT PRENATAL CARE: CPT | Performed by: ADVANCED PRACTICE MIDWIFE

## 2025-05-31 NOTE — PROGRESS NOTES
Mya Cavanaugh is a 24 y.o. female 20w6d    The patient was seen and evaluated. There was positive fetal movements. No contractions or leakage of fluid. Signs and symptoms of  labor as well as labor were reviewed. The patients anatomy ultrasound has been completed and reviewed with patient (nasal bone needs repeat imaging) A 28 week lab panel was discussed.    The S/S of Pre-Eclampsia were reviewed with the patient in detail. She is to report any of these if they occur. She currently denies any of these.    The patient is RH positive Rhogam Ordered no    The patient was instructed on fetal kick counts and was encouraged to monitor every 8 hours. This is to begin at 28 weeks gestation. She was instructed that the baby should move at a minimum of ten times within one hour after a meal. The patient was instructed to lay down on her left side twenty minutes after eating and count movements for up to one hour with a target value of ten movements.  She was instructed to notify the office if she did not make that target after two attempts or if after any attempt there was less than four movements.      Assessment:  1. Mya Cavanaugh is a 24 y.o. female  2.   3. 20w6d    Patient Active Problem List    Diagnosis Date Noted    40 weeks gestation of pregnancy 2023    Uterine contractions during pregnancy 2023    39 weeks gestation of pregnancy 2023    Abdominal cramping affecting pregnancy, antepartum 2023    Abdominal cramping 2023    Abdominal pain 2023        Diagnosis Orders   1. Prenatal care, subsequent pregnancy, second trimester        2. 21 weeks gestation of pregnancy              Plan:  The patient will return to the office for her next visit in 4 weeks. See antepartum flow sheet.    Will repeat USN for nasal bone evaluation in 4 weeks

## 2025-06-02 ENCOUNTER — ROUTINE PRENATAL (OUTPATIENT)
Dept: OBGYN CLINIC | Age: 25
End: 2025-06-02

## 2025-06-02 VITALS — WEIGHT: 241 LBS | DIASTOLIC BLOOD PRESSURE: 80 MMHG | BODY MASS INDEX: 36.64 KG/M2 | SYSTOLIC BLOOD PRESSURE: 120 MMHG

## 2025-06-02 DIAGNOSIS — Z34.82 PRENATAL CARE, SUBSEQUENT PREGNANCY, SECOND TRIMESTER: Primary | ICD-10-CM

## 2025-06-02 DIAGNOSIS — Z3A.21 21 WEEKS GESTATION OF PREGNANCY: ICD-10-CM

## 2025-06-02 PROCEDURE — 0502F SUBSEQUENT PRENATAL CARE: CPT | Performed by: ADVANCED PRACTICE MIDWIFE

## 2025-06-03 ENCOUNTER — TELEPHONE (OUTPATIENT)
Dept: OBGYN CLINIC | Age: 25
End: 2025-06-03

## 2025-06-30 NOTE — PROGRESS NOTES
Mya Cavanaugh is a 24 y.o. female 25w1d    The patient was seen and evaluated. There was positive fetal movements. No contractions or leakage of fluid. Signs and symptoms of  labor as well as labor were reviewed. The patients anatomy ultrasound has been completed and reviewed with patient. A 28 week lab panel was ordered. This includes a (HH, 3 hr GTT). The patient is to complete this in the next two to four weeks.    The S/S of Pre-Eclampsia were reviewed with the patient in detail. She is to report any of these if they occur. She currently denies any of these.    The patient is RH positive Rhogam Ordered no    The patient was instructed on fetal kick counts and was encouraged to monitor every 8 hours. This is to begin at 28 weeks gestation. She was instructed that the baby should move at a minimum of ten times within one hour after a meal. The patient was instructed to lay down on her left side twenty minutes after eating and count movements for up to one hour with a target value of ten movements.  She was instructed to notify the office if she did not make that target after two attempts or if after any attempt there was less than four movements.      Assessment:  1. Mya Cavanaugh is a 24 y.o. female  2.   3. 25w1d    Patient Active Problem List    Diagnosis Date Noted    40 weeks gestation of pregnancy 2023    Uterine contractions during pregnancy 2023    39 weeks gestation of pregnancy 2023    Abdominal cramping affecting pregnancy, antepartum 2023    Abdominal cramping 2023    Abdominal pain 2023        Diagnosis Orders   1. Prenatal care, subsequent pregnancy, second trimester  CBC with Auto Differential    Glucose Tolerance, 3 Hours      2. 25 weeks gestation of pregnancy              Plan:  The patient will return to the office for her next visit in 3 weeks. See antepartum flow sheet.      Recommended referral to [facility] for evaluation of possible

## 2025-07-01 ENCOUNTER — TELEPHONE (OUTPATIENT)
Dept: OBGYN CLINIC | Age: 25
End: 2025-07-01

## 2025-07-01 ENCOUNTER — ROUTINE PRENATAL (OUTPATIENT)
Dept: OBGYN CLINIC | Age: 25
End: 2025-07-01

## 2025-07-01 VITALS — DIASTOLIC BLOOD PRESSURE: 74 MMHG | BODY MASS INDEX: 37.1 KG/M2 | SYSTOLIC BLOOD PRESSURE: 110 MMHG | WEIGHT: 244 LBS

## 2025-07-01 DIAGNOSIS — Z3A.25 25 WEEKS GESTATION OF PREGNANCY: ICD-10-CM

## 2025-07-01 DIAGNOSIS — O28.3 ABNORMAL FETAL ULTRASOUND: ICD-10-CM

## 2025-07-01 DIAGNOSIS — Z34.82 PRENATAL CARE, SUBSEQUENT PREGNANCY, SECOND TRIMESTER: Primary | ICD-10-CM

## 2025-07-01 PROCEDURE — 0502F SUBSEQUENT PRENATAL CARE: CPT | Performed by: ADVANCED PRACTICE MIDWIFE

## 2025-07-01 NOTE — TELEPHONE ENCOUNTER
Please refer patient to [facility] - Dr Yun.  Unable to see nasal bone x 2 USN's - would like his second opinion   Thank you

## 2025-07-09 ENCOUNTER — HOSPITAL ENCOUNTER (OUTPATIENT)
Age: 25
Discharge: HOME | End: 2025-07-09
Payer: COMMERCIAL

## 2025-07-09 ENCOUNTER — ROUTINE PRENATAL (OUTPATIENT)
Age: 25
End: 2025-07-09

## 2025-07-09 VITALS
HEART RATE: 101 BPM | TEMPERATURE: 97.4 F | RESPIRATION RATE: 16 BRPM | HEIGHT: 68 IN | BODY MASS INDEX: 37.12 KG/M2 | SYSTOLIC BLOOD PRESSURE: 102 MMHG | DIASTOLIC BLOOD PRESSURE: 72 MMHG | WEIGHT: 244.93 LBS

## 2025-07-09 DIAGNOSIS — Z34.90 SECOND PREGNANCY: ICD-10-CM

## 2025-07-09 DIAGNOSIS — Q75.8 CONGENITAL ABSENCE OF NASAL BONE: ICD-10-CM

## 2025-07-09 DIAGNOSIS — Z3A.26 26 WEEKS GESTATION OF PREGNANCY: Primary | ICD-10-CM

## 2025-07-09 DIAGNOSIS — Z3A.26 26 WEEKS GESTATION OF PREGNANCY: ICD-10-CM

## 2025-07-09 PROBLEM — Z3A.39 39 WEEKS GESTATION OF PREGNANCY: Status: RESOLVED | Noted: 2023-09-11 | Resolved: 2025-07-09

## 2025-07-09 PROBLEM — O47.9 UTERINE CONTRACTIONS DURING PREGNANCY: Status: RESOLVED | Noted: 2023-09-17 | Resolved: 2025-07-09

## 2025-07-09 PROBLEM — R10.9 ABDOMINAL CRAMPING AFFECTING PREGNANCY, ANTEPARTUM: Status: RESOLVED | Noted: 2023-09-05 | Resolved: 2025-07-09

## 2025-07-09 PROBLEM — R10.9 ABDOMINAL PAIN: Status: RESOLVED | Noted: 2023-06-01 | Resolved: 2025-07-09

## 2025-07-09 PROBLEM — R10.9 ABDOMINAL CRAMPING: Status: RESOLVED | Noted: 2023-07-30 | Resolved: 2025-07-09

## 2025-07-09 PROBLEM — O26.899 ABDOMINAL CRAMPING AFFECTING PREGNANCY, ANTEPARTUM: Status: RESOLVED | Noted: 2023-09-05 | Resolved: 2025-07-09

## 2025-07-09 PROBLEM — Z3A.40 40 WEEKS GESTATION OF PREGNANCY: Status: RESOLVED | Noted: 2023-09-18 | Resolved: 2025-07-09

## 2025-07-09 LAB
SEND OUT REPORT: NORMAL
TEST NAME: NORMAL

## 2025-07-09 PROCEDURE — 36415 COLL VENOUS BLD VENIPUNCTURE: CPT

## 2025-07-09 NOTE — PROGRESS NOTES
[facility] MATERNAL FETAL MED  [address]  [facility] [address]  Dept: [phone]     2025    RE:  Mya Cavanaugh     : 2000   AGE: 24 y.o.     Dear Dr. Simon,    Thank you for allowing me to see Mya Cavanaugh.  As I'm sure you will recall, Mya Cavanaugh is a 24 y.o. [id number]'s last menstrual period was 2025. Estimated Date of Delivery: 10/12/25 at 26w3d seen in our office today for the following:    REASON FOR VISIT: Level II    Patient Active Problem List    Diagnosis Date Noted    26 weeks gestation of pregnancy 2025    Second pregnancy 2025    Congenital absence of nasal bone 2025        PAST HISTORY:  OB History    Para Term  AB Living   2 1 1 0 0 1   SAB IAB Ectopic Molar Multiple Live Births   0 0 0 0 0 1      # Outcome Date GA Lbr Jignesh/2nd Weight Sex Type Anes PTL Lv   2 Current            1 Term 23 40w2d / 00:40 3.799 kg (8 lb 6 oz) F Vag-Spont EPI N MARY          MEDICAL:  Past Medical History:   Diagnosis Date    Drug effect 2013    Penicillin    Infertility, female     PCOS (polycystic ovarian syndrome)         SURGICAL:  Past Surgical History:   Procedure Laterality Date    CHOLECYSTECTOMY  2024    CYST REMOVAL      head    LAPAROSCOPY N/A 2022    LAPAROSCOPY EXPLORATORY- DIAGNOSTIC , LYSIS OF ADHESIONS OF ENDOMETRIOSIS, CHROMOPERTUBATION performed by Helen Yoo DO at [facility] OR       ALLERGIES:    Allergies   Allergen Reactions    Pcn [Penicillins] Hives and Shortness Of Breath         MEDICATIONS:    Current Outpatient Medications   Medication Sig Dispense Refill    Prenatal Vit-Fe Fumarate-FA (PRENATAL VITAMIN PO) Take by mouth      NONFORMULARY Progesterone 100mg vaginal supp. Use cycle day 16 until menses or if becomes pregnant until end of first trimester. (Patient not taking: Reported on 2025)       No current

## 2025-07-28 ENCOUNTER — TELEPHONE (OUTPATIENT)
Dept: OBGYN CLINIC | Age: 25
End: 2025-07-28

## 2025-07-28 NOTE — PROGRESS NOTES
Mya Cavanaugh is a 24 y.o. female 29w1d    The patient was seen and evaluated. There was positive fetal movements. No contractions or leakage of fluid. Signs and symptoms of  labor as well as labor were reviewed. The S/S of Pre-Eclampsia were reviewed with the patient in detail. She is to report any of these if they occur. She currently denies any of these.    Having some heartburn - not taking any medications for this at this time.     The patient had her 28 week labs in process.  Hospital Outpatient Visit on 2025   Component Date Value Ref Range Status    Test Name 2025 BILLION TO ONE   Final    Send Out Report 2025 FORWARD TO [place]   Final         T-Dap Vaccine (27-36 weeks): declined    The patient was instructed on fetal kick counts and is encouraged to monitor every 8 hours. She was instructed that the baby should move at a minimum of ten times within one hour after a meal. The patient was instructed to lay down on her left side twenty minutes after eating and count movements for up to one hour with a target value of ten movements.  She was instructed to notify the office if she did not make that target after two attempts or if after any attempt there was less than four movements.    The patient reports that the fetal movement targets have been made Yes.     Testing:  Growth next visit    Assessment:  1. Mya Cavanaugh is a 24 y.o. female  2.   3. 29w1d    Patient Active Problem List    Diagnosis Date Noted    26 weeks gestation of pregnancy 2025    Second pregnancy 2025    Congenital absence of nasal bone 2025        Diagnosis Orders   1. Prenatal care, subsequent pregnancy, third trimester        2. 29 weeks gestation of pregnancy              Plan:  The patient will return to the office for her next visit in 2 weeks. See antepartum flow sheet.   Visits will continue every 2 weeks in the third trimester.  At 36 weeks will have GBS swab

## 2025-07-28 NOTE — TELEPHONE ENCOUNTER
I called patient. She is planning to do 3 hr glucose test tomorrow and is aware that she needs to be fasting and arrive between 7:30-8:30 AM

## 2025-07-28 NOTE — TELEPHONE ENCOUNTER
Please remind patient that tomorrow can complete her fasting 3 hour GTT with office visit.  Can arrive between 7:30-8:30 to start GTT and have visit while in office.

## 2025-07-29 ENCOUNTER — ROUTINE PRENATAL (OUTPATIENT)
Dept: OBGYN CLINIC | Age: 25
End: 2025-07-29

## 2025-07-29 ENCOUNTER — TELEPHONE (OUTPATIENT)
Dept: OBGYN CLINIC | Age: 25
End: 2025-07-29

## 2025-07-29 ENCOUNTER — HOSPITAL ENCOUNTER (OUTPATIENT)
Age: 25
Setting detail: SPECIMEN
Discharge: HOME | End: 2025-07-29

## 2025-07-29 VITALS — DIASTOLIC BLOOD PRESSURE: 80 MMHG | SYSTOLIC BLOOD PRESSURE: 112 MMHG | BODY MASS INDEX: 36.8 KG/M2 | WEIGHT: 242 LBS

## 2025-07-29 DIAGNOSIS — Z34.82 PRENATAL CARE, SUBSEQUENT PREGNANCY, SECOND TRIMESTER: ICD-10-CM

## 2025-07-29 DIAGNOSIS — Z3A.29 29 WEEKS GESTATION OF PREGNANCY: ICD-10-CM

## 2025-07-29 DIAGNOSIS — Z34.83 PRENATAL CARE, SUBSEQUENT PREGNANCY, THIRD TRIMESTER: Primary | ICD-10-CM

## 2025-07-29 LAB
AMOUNT GLUCOSE GIVEN: 100 G
BASOPHILS # BLD: <0.03 K/UL (ref 0–0.2)
BASOPHILS NFR BLD: 0 % (ref 0–2)
EOSINOPHIL # BLD: 0.21 K/UL (ref 0–0.44)
EOSINOPHILS RELATIVE PERCENT: 2 % (ref 1–4)
ERYTHROCYTE [DISTWIDTH] IN BLOOD BY AUTOMATED COUNT: 13 % (ref 11.8–14.4)
GLUCOSE 2H P 75 G GLC PO SERPL-MCNC: 75 MG/DL
GLUCOSE TOLERANCE TEST 1 HOUR: 152 MG/DL
GLUCOSE TOLERANCE TEST 2 HOUR: 140 MG/DL
GLUCOSE TOLERANCE TEST 3 HOUR: 103 MG/DL
HCT VFR BLD AUTO: 36.7 % (ref 36.3–47.1)
HGB BLD-MCNC: 12.5 G/DL (ref 11.9–15.1)
IMM GRANULOCYTES # BLD AUTO: 0.05 K/UL (ref 0–0.3)
IMM GRANULOCYTES NFR BLD: 0 %
LYMPHOCYTES NFR BLD: 2.55 K/UL (ref 1.1–3.7)
LYMPHOCYTES RELATIVE PERCENT: 23 % (ref 24–43)
MCH RBC QN AUTO: 30.5 PG (ref 25.2–33.5)
MCHC RBC AUTO-ENTMCNC: 34.1 G/DL (ref 28.4–34.8)
MCV RBC AUTO: 89.5 FL (ref 82.6–102.9)
MONOCYTES NFR BLD: 0.59 K/UL (ref 0.1–1.2)
MONOCYTES NFR BLD: 5 % (ref 3–12)
NEUTROPHILS NFR BLD: 70 % (ref 36–65)
NEUTS SEG NFR BLD: 7.77 K/UL (ref 1.5–8.1)
NRBC BLD-RTO: 0 PER 100 WBC
PLATELET # BLD AUTO: 211 K/UL (ref 138–453)
PMV BLD AUTO: 11.6 FL (ref 8.1–13.5)
RBC # BLD AUTO: 4.1 M/UL (ref 3.95–5.11)
WBC OTHER # BLD: 11.2 K/UL (ref 3.5–11.3)

## 2025-07-29 PROCEDURE — 0502F SUBSEQUENT PRENATAL CARE: CPT | Performed by: ADVANCED PRACTICE MIDWIFE

## 2025-07-29 NOTE — TELEPHONE ENCOUNTER
Pt called verifying that her glucose test from today was WNL. Per B Alfred result note: GTT was normal.

## 2025-08-11 ENCOUNTER — ROUTINE PRENATAL (OUTPATIENT)
Dept: OBGYN CLINIC | Age: 25
End: 2025-08-11

## 2025-08-11 VITALS — SYSTOLIC BLOOD PRESSURE: 110 MMHG | WEIGHT: 244 LBS | BODY MASS INDEX: 37.1 KG/M2 | DIASTOLIC BLOOD PRESSURE: 64 MMHG

## 2025-08-11 DIAGNOSIS — Z3A.31 31 WEEKS GESTATION OF PREGNANCY: ICD-10-CM

## 2025-08-11 DIAGNOSIS — Z34.83 PRENATAL CARE, SUBSEQUENT PREGNANCY, THIRD TRIMESTER: Primary | ICD-10-CM

## 2025-08-11 PROCEDURE — 0502F SUBSEQUENT PRENATAL CARE: CPT | Performed by: ADVANCED PRACTICE MIDWIFE

## 2025-08-20 ENCOUNTER — TELEPHONE (OUTPATIENT)
Dept: OBGYN CLINIC | Age: 25
End: 2025-08-20

## 2025-08-27 ENCOUNTER — ROUTINE PRENATAL (OUTPATIENT)
Dept: OBGYN CLINIC | Age: 25
End: 2025-08-27

## 2025-08-27 VITALS — BODY MASS INDEX: 37.25 KG/M2 | SYSTOLIC BLOOD PRESSURE: 120 MMHG | DIASTOLIC BLOOD PRESSURE: 72 MMHG | WEIGHT: 245 LBS

## 2025-08-27 DIAGNOSIS — Z34.83 PRENATAL CARE, SUBSEQUENT PREGNANCY, THIRD TRIMESTER: Primary | ICD-10-CM

## 2025-08-27 DIAGNOSIS — Z3A.33 33 WEEKS GESTATION OF PREGNANCY: ICD-10-CM

## 2025-08-27 PROCEDURE — 0502F SUBSEQUENT PRENATAL CARE: CPT | Performed by: ADVANCED PRACTICE MIDWIFE

## (undated) DEVICE — COVER,MAYO STAND,STERILE: Brand: MEDLINE

## (undated) DEVICE — SUTURE MCRYL SZ 4-0 L27IN ABSRB UD L19MM PS-2 1/2 CIR PRIM Y426H

## (undated) DEVICE — GLOVE SURG SZ 65 L12IN FNGR THK87MIL WHT LTX FREE

## (undated) DEVICE — UNDERPANTS INCONT XL 45-70IN KNIT SEAMLESS DSGN COLOR-CODED

## (undated) DEVICE — DEVICE MANIP L330MM DIA4.5MM UTER DISP INJ ZINNANTI KRONNER

## (undated) DEVICE — HYPODERMIC SAFETY NEEDLE: Brand: MAGELLAN

## (undated) DEVICE — SOLUTION IV IRRIG POUR BRL 0.9% SODIUM CHL 2F7124

## (undated) DEVICE — APPLICATOR MEDICATED 26 CC SOLUTION HI LT ORNG CHLORAPREP

## (undated) DEVICE — TROCAR ENDOSCP L110MM DIA5MM STD CANN DIL BLDELSS BLNT TIP

## (undated) DEVICE — LAVH-LF: Brand: MEDLINE INDUSTRIES, INC.

## (undated) DEVICE — GOWN,AURORA,NONRNF,XL,30/CS: Brand: MEDLINE

## (undated) DEVICE — DRAPE,UTILTY,TAPE,15X26, 4EA/PK: Brand: MEDLINE

## (undated) DEVICE — ELECTRODE PT RET AD L9FT HI MOIST COND ADH HYDRGEL CORDED

## (undated) DEVICE — [HIGH FLOW INSUFFLATOR,  DO NOT USE IF PACKAGE IS DAMAGED,  KEEP DRY,  KEEP AWAY FROM SUNLIGHT,  PROTECT FROM HEAT AND RADIOACTIVE SOURCES.]: Brand: PNEUMOSURE

## (undated) DEVICE — TROCAR: Brand: KII FIOS FIRST ENTRY

## (undated) DEVICE — SUTURE VCRL SZ 3-0 L27IN ABSRB UD L26MM SH 1/2 CIR J416H

## (undated) DEVICE — SOLUTION SCRB 4OZ 4% CHG H2O AIDED FOR PREOPERATIVE SKIN

## (undated) DEVICE — WARMER SCP 2 ANTIFOG LAP DISP

## (undated) DEVICE — PREP PAD BNS: Brand: CONVERTORS

## (undated) DEVICE — CATHETER URETH 16FR L16IN RED RUB INTMIT ROB MOD BARDX

## (undated) DEVICE — GLOVE SURG SZ 65 THK91MIL LTX FREE SYN POLYISOPRENE

## (undated) DEVICE — COVER LT HNDL BLU PLAS

## (undated) DEVICE — MAGNETIC IMPLANT S1000-00: Brand: SOPHONO™

## (undated) DEVICE — PAD,SANITARY,11 IN,MAXI,N-STRL,IND WRAP: Brand: MEDLINE

## (undated) DEVICE — PREMIUM DRY TRAY LF: Brand: MEDLINE INDUSTRIES, INC.

## (undated) DEVICE — BLADELESS OBTURATOR: Brand: WECK VISTA

## (undated) DEVICE — ENDOSCOPIC KIT CLN SWAB MICROFIBER CLTH SCP CLEANOR DISP

## (undated) DEVICE — SYRINGE MED 10ML LUERLOCK TIP W/O SFTY DISP